# Patient Record
Sex: MALE | Race: BLACK OR AFRICAN AMERICAN | NOT HISPANIC OR LATINO | Employment: FULL TIME | URBAN - METROPOLITAN AREA
[De-identification: names, ages, dates, MRNs, and addresses within clinical notes are randomized per-mention and may not be internally consistent; named-entity substitution may affect disease eponyms.]

---

## 2023-06-20 ENCOUNTER — OFFICE VISIT (OUTPATIENT)
Dept: PRIMARY CARE | Facility: CLINIC | Age: 53
End: 2023-06-20
Payer: COMMERCIAL

## 2023-06-20 VITALS
OXYGEN SATURATION: 98 % | TEMPERATURE: 98 F | BODY MASS INDEX: 27.32 KG/M2 | SYSTOLIC BLOOD PRESSURE: 145 MMHG | HEIGHT: 65 IN | DIASTOLIC BLOOD PRESSURE: 93 MMHG | RESPIRATION RATE: 17 BRPM | WEIGHT: 164 LBS | HEART RATE: 69 BPM

## 2023-06-20 DIAGNOSIS — E78.5 HYPERLIPIDEMIA, UNSPECIFIED HYPERLIPIDEMIA TYPE: ICD-10-CM

## 2023-06-20 DIAGNOSIS — Z00.00 HEALTHCARE MAINTENANCE: ICD-10-CM

## 2023-06-20 DIAGNOSIS — M25.512 CHRONIC LEFT SHOULDER PAIN: ICD-10-CM

## 2023-06-20 DIAGNOSIS — G89.29 CHRONIC LEFT SHOULDER PAIN: ICD-10-CM

## 2023-06-20 DIAGNOSIS — I10 PRIMARY HYPERTENSION: Primary | ICD-10-CM

## 2023-06-20 DIAGNOSIS — R73.03 PREDIABETES: ICD-10-CM

## 2023-06-20 PROCEDURE — 93000 ELECTROCARDIOGRAM COMPLETE: CPT | Performed by: STUDENT IN AN ORGANIZED HEALTH CARE EDUCATION/TRAINING PROGRAM

## 2023-06-20 PROCEDURE — 99214 OFFICE O/P EST MOD 30 MIN: CPT | Performed by: STUDENT IN AN ORGANIZED HEALTH CARE EDUCATION/TRAINING PROGRAM

## 2023-06-20 PROCEDURE — 3077F SYST BP >= 140 MM HG: CPT | Performed by: STUDENT IN AN ORGANIZED HEALTH CARE EDUCATION/TRAINING PROGRAM

## 2023-06-20 PROCEDURE — 3080F DIAST BP >= 90 MM HG: CPT | Performed by: STUDENT IN AN ORGANIZED HEALTH CARE EDUCATION/TRAINING PROGRAM

## 2023-06-20 RX ORDER — LISINOPRIL 40 MG/1
40 TABLET ORAL DAILY
Qty: 30 TABLET | Refills: 2 | Status: SHIPPED | OUTPATIENT
Start: 2023-06-20 | End: 2023-07-14

## 2023-06-20 RX ORDER — LISINOPRIL 30 MG/1
30 TABLET ORAL DAILY
COMMUNITY
End: 2023-06-20 | Stop reason: SDUPTHER

## 2023-06-20 RX ORDER — HYDROCHLOROTHIAZIDE 25 MG/1
25 TABLET ORAL DAILY
COMMUNITY
End: 2023-12-07 | Stop reason: SDUPTHER

## 2023-06-20 NOTE — PROGRESS NOTES
"Subjective   Patient ID: Joe Muñoz is a 52 y.o. male who presents for follow-up  HPI  Overall patient is here for a follow-up.  Reports ongoing left shoulder pain since March, 6/10 in intensity, nonradiating, aggravated by movement especially lifting his hand around shoulder level and reduced at rest.  As per hypertension he reports to be compliant with meds.  Denies any other complaints.         Past Medical History:   Diagnosis Date    Essential (primary) hypertension 10/14/2022    Hypertension    Personal history of peptic ulcer disease     History of peptic ulcer      Past Surgical History:   Procedure Laterality Date    OTHER SURGICAL HISTORY  05/06/2015    Open Treatment Of Fracture Of One Metacarpal Bone        No Known Allergies       Occupation: works for [employer]     Review of Systems   Constitutional:  Negative for activity change and fever.   HENT:  Negative for congestion.    Respiratory:  Negative for cough, shortness of breath and wheezing.    Cardiovascular:  Negative for chest pain and leg swelling.   Gastrointestinal:  Negative for abdominal pain, constipation, nausea and vomiting.   Endocrine: Negative for cold intolerance.   Genitourinary:  Negative for dysuria, hematuria and urgency.   Musculoskeletal:  Positive for arthralgias.   Neurological:  Negative for dizziness, speech difficulty, weakness and numbness.   Psychiatric/Behavioral:  Negative for self-injury and suicidal ideas.        Objective   Visit Vitals  BP (!) 145/93   Pulse 69   Temp 36.7 °C (98 °F)   Resp 17   Ht 1.651 m (5' 5\")   Wt 74.4 kg (164 lb)   SpO2 98%   BMI 27.29 kg/m²   Smoking Status Never   BSA 1.85 m²      Physical Exam  HENT:      Head: Normocephalic and atraumatic.      Right Ear: Tympanic membrane normal.      Left Ear: Tympanic membrane normal.      Nose: Nose normal.      Mouth/Throat:      Mouth: Mucous membranes are moist.   Eyes:      Extraocular Movements: Extraocular movements intact.      " Conjunctiva/sclera: Conjunctivae normal.      Pupils: Pupils are equal, round, and reactive to light.   Cardiovascular:      Rate and Rhythm: Normal rate and regular rhythm.      Pulses: Normal pulses.      Heart sounds: Normal heart sounds.   Pulmonary:      Effort: Pulmonary effort is normal.      Breath sounds: Normal breath sounds. No stridor. No rhonchi.   Abdominal:      General: Bowel sounds are normal.      Palpations: Abdomen is soft.      Tenderness: There is no abdominal tenderness. There is no guarding or rebound.   Musculoskeletal:      Right shoulder: Normal. No swelling.      Left shoulder: No swelling, deformity, effusion, laceration, tenderness or bony tenderness. Decreased range of motion.      Cervical back: Neck supple.   Neurological:      Mental Status: He is oriented to person, place, and time.   Psychiatric:         Mood and Affect: Mood normal.         Behavior: Behavior normal.         Assessment/Plan     Problem List Items Addressed This Visit    None  Visit Diagnoses       Primary hypertension    -  Primary    Relevant Medications    lisinopril 40 mg tablet    Other Relevant Orders    CBC (Completed)    Comprehensive Metabolic Panel (Completed)    CT cardiac scoring wo IV contrast    ECG 12 lead (Completed)    Healthcare maintenance        Relevant Orders    PSA (Completed)    Hepatitis B Surface Antigen (Completed)    Hepatitis B Surface Antibody (Completed)    Prediabetes        Relevant Orders    Hemoglobin A1C (Completed)    Lipid Panel (Completed)    Hyperlipidemia, unspecified hyperlipidemia type        Relevant Orders    Lipid Panel (Completed)    TSH with reflex to Free T4 if abnormal (Completed)    CT cardiac scoring wo IV contrast    Chronic left shoulder pain        Relevant Orders    Referral to Orthopaedic Surgery    XR shoulder left 2+ views (Completed)            Overall patient is here for follow-up blood pressures above goal.  Repeat blood pressure reading around the  same.  Advised patient to increase lisinopril to 40 mg.  Agreeable  The 10-year ASCVD risk score (Caty HINKLE, et al., 2019) is: 11.4%    Values used to calculate the score:      Age: 52 years      Sex: Male      Is Non- : Yes      Diabetic: No      Tobacco smoker: No      Systolic Blood Pressure: 145 mmHg      Is BP treated: Yes      HDL Cholesterol: 58.4 mg/dL      Total Cholesterol: 224 mg/dL     Other labs for prediabetes, prostate cancer screening added on.  Patient agreeable  In office EKG shows normal sinus rhythm with no ST-T wave changes.  Cologuard screening in 2022 was negative.  Wants to think about vaccinations advise CT cardiac scoring due to hyperlipidemia, prediabetes and hypertension.  Agreeable  Once about results are obtained we will call the patient with abnormal results of any and discuss further evaluation and management.

## 2023-06-20 NOTE — PATIENT INSTRUCTIONS
Here is a summary of what we have discussed  1.  Your blood pressure was slightly elevated.  We are increasing your lisinopril to 40 mg.  Please continue taking hydrochlorothiazide  2.  We have ordered some blood work to check your cholesterol, prediabetics and prostate specific antigen [for prostate cancer screening].  Once we have the labs will call to discuss this  3.  We checked an EKG of your heart.  It is within normal limits.  I have ordered a CT cardiac calcium scoring.  Please call the scheduling line below to set up this appointment  4.  We have ordered a left shoulder x-ray to evaluate your left shoulder pain.  I have also placed an orthopedic referral for this.  Please call the scheduling line to set up for both x-ray and orthopedic referral.  Please see me back in 3 months for a blood pressure and cholesterol check.

## 2023-06-21 ENCOUNTER — LAB (OUTPATIENT)
Dept: LAB | Facility: LAB | Age: 53
End: 2023-06-21
Payer: COMMERCIAL

## 2023-06-21 DIAGNOSIS — I10 PRIMARY HYPERTENSION: ICD-10-CM

## 2023-06-21 DIAGNOSIS — R73.03 PREDIABETES: ICD-10-CM

## 2023-06-21 DIAGNOSIS — E78.5 HYPERLIPIDEMIA, UNSPECIFIED HYPERLIPIDEMIA TYPE: ICD-10-CM

## 2023-06-21 DIAGNOSIS — Z00.00 HEALTHCARE MAINTENANCE: ICD-10-CM

## 2023-06-21 LAB
ALANINE AMINOTRANSFERASE (SGPT) (U/L) IN SER/PLAS: 25 U/L (ref 10–52)
ALBUMIN (G/DL) IN SER/PLAS: 4.5 G/DL (ref 3.4–5)
ALKALINE PHOSPHATASE (U/L) IN SER/PLAS: 79 U/L (ref 33–120)
ANION GAP IN SER/PLAS: 11 MMOL/L (ref 10–20)
ASPARTATE AMINOTRANSFERASE (SGOT) (U/L) IN SER/PLAS: 22 U/L (ref 9–39)
BILIRUBIN TOTAL (MG/DL) IN SER/PLAS: 0.3 MG/DL (ref 0–1.2)
CALCIUM (MG/DL) IN SER/PLAS: 9.8 MG/DL (ref 8.6–10.6)
CARBON DIOXIDE, TOTAL (MMOL/L) IN SER/PLAS: 29 MMOL/L (ref 21–32)
CHLORIDE (MMOL/L) IN SER/PLAS: 102 MMOL/L (ref 98–107)
CHOLESTEROL (MG/DL) IN SER/PLAS: 202 MG/DL (ref 0–199)
CHOLESTEROL IN HDL (MG/DL) IN SER/PLAS: 58.7 MG/DL
CHOLESTEROL/HDL RATIO: 3.4
CREATININE (MG/DL) IN SER/PLAS: 0.93 MG/DL (ref 0.5–1.3)
ERYTHROCYTE DISTRIBUTION WIDTH (RATIO) BY AUTOMATED COUNT: 12.6 % (ref 11.5–14.5)
ERYTHROCYTE MEAN CORPUSCULAR HEMOGLOBIN CONCENTRATION (G/DL) BY AUTOMATED: 34.4 G/DL (ref 32–36)
ERYTHROCYTE MEAN CORPUSCULAR VOLUME (FL) BY AUTOMATED COUNT: 91 FL (ref 80–100)
ERYTHROCYTES (10*6/UL) IN BLOOD BY AUTOMATED COUNT: 4.9 X10E12/L (ref 4.5–5.9)
GFR MALE: >90 ML/MIN/1.73M2
GLUCOSE (MG/DL) IN SER/PLAS: 102 MG/DL (ref 74–99)
HEMATOCRIT (%) IN BLOOD BY AUTOMATED COUNT: 44.5 % (ref 41–52)
HEMOGLOBIN (G/DL) IN BLOOD: 15.3 G/DL (ref 13.5–17.5)
LDL: 110 MG/DL (ref 0–99)
LEUKOCYTES (10*3/UL) IN BLOOD BY AUTOMATED COUNT: 5.1 X10E9/L (ref 4.4–11.3)
NRBC (PER 100 WBCS) BY AUTOMATED COUNT: 0 /100 WBC (ref 0–0)
PLATELETS (10*3/UL) IN BLOOD AUTOMATED COUNT: 241 X10E9/L (ref 150–450)
POTASSIUM (MMOL/L) IN SER/PLAS: 3.9 MMOL/L (ref 3.5–5.3)
PROTEIN TOTAL: 7.6 G/DL (ref 6.4–8.2)
SODIUM (MMOL/L) IN SER/PLAS: 138 MMOL/L (ref 136–145)
TRIGLYCERIDE (MG/DL) IN SER/PLAS: 169 MG/DL (ref 0–149)
UREA NITROGEN (MG/DL) IN SER/PLAS: 14 MG/DL (ref 6–23)
VLDL: 34 MG/DL (ref 0–40)

## 2023-06-21 PROCEDURE — 80061 LIPID PANEL: CPT

## 2023-06-21 PROCEDURE — 83036 HEMOGLOBIN GLYCOSYLATED A1C: CPT

## 2023-06-21 PROCEDURE — 36415 COLL VENOUS BLD VENIPUNCTURE: CPT

## 2023-06-21 PROCEDURE — 87340 HEPATITIS B SURFACE AG IA: CPT

## 2023-06-21 PROCEDURE — 84153 ASSAY OF PSA TOTAL: CPT

## 2023-06-21 PROCEDURE — 84443 ASSAY THYROID STIM HORMONE: CPT

## 2023-06-21 PROCEDURE — 80053 COMPREHEN METABOLIC PANEL: CPT

## 2023-06-21 PROCEDURE — 85027 COMPLETE CBC AUTOMATED: CPT

## 2023-06-21 PROCEDURE — 86706 HEP B SURFACE ANTIBODY: CPT

## 2023-06-22 LAB
ESTIMATED AVERAGE GLUCOSE FOR HBA1C: 114 MG/DL
HEMOGLOBIN A1C/HEMOGLOBIN TOTAL IN BLOOD: 5.6 %
HEPATITIS B VIRUS SURFACE AB (MIU/ML) IN SERUM: <3.1 MIU/ML
HEPATITIS B VIRUS SURFACE AG PRESENCE IN SERUM: NONREACTIVE
PROSTATE SPECIFIC AG (NG/ML) IN SER/PLAS: 0.46 NG/ML (ref 0–4)
THYROTROPIN (MIU/L) IN SER/PLAS BY DETECTION LIMIT <= 0.05 MIU/L: 1.35 MIU/L (ref 0.44–3.98)

## 2023-06-30 ENCOUNTER — TELEPHONE (OUTPATIENT)
Dept: PRIMARY CARE | Facility: CLINIC | Age: 53
End: 2023-06-30
Payer: COMMERCIAL

## 2023-06-30 DIAGNOSIS — M25.512 CHRONIC LEFT SHOULDER PAIN: Primary | ICD-10-CM

## 2023-06-30 DIAGNOSIS — G89.29 CHRONIC LEFT SHOULDER PAIN: Primary | ICD-10-CM

## 2023-06-30 NOTE — TELEPHONE ENCOUNTER
Result Communication    Resulted Orders   Hemoglobin A1C   Result Value Ref Range    Hemoglobin A1C 5.6 %      Comment:           Diagnosis of Diabetes-Adults   Non-Diabetic: < or = 5.6%   Increased risk for developing diabetes: 5.7-6.4%   Diagnostic of diabetes: > or = 6.5%  .       Monitoring of Diabetes                Age (y)     Therapeutic Goal (%)   Adults:          >18           <7.0   Pediatrics:    13-18           <7.5                   7-12           <8.0                   0- 6            7.5-8.5   American Diabetes Association. Diabetes Care 33(S1), Jan 2010.    Estimated Average Glucose 114 MG/DL   CBC   Result Value Ref Range    WBC 5.1 4.4 - 11.3 x10E9/L    nRBC 0.0 0.0 - 0.0 /100 WBC    RBC 4.90 4.50 - 5.90 x10E12/L    Hemoglobin 15.3 13.5 - 17.5 g/dL    Hematocrit 44.5 41.0 - 52.0 %    MCV 91 80 - 100 fL    MCHC 34.4 32.0 - 36.0 g/dL    Platelets 241 150 - 450 x10E9/L    RDW 12.6 11.5 - 14.5 %   Comprehensive Metabolic Panel   Result Value Ref Range    Glucose 102 (H) 74 - 99 mg/dL    Sodium 138 136 - 145 mmol/L    Potassium 3.9 3.5 - 5.3 mmol/L    Chloride 102 98 - 107 mmol/L    Bicarbonate 29 21 - 32 mmol/L    Anion Gap 11 10 - 20 mmol/L    Urea Nitrogen 14 6 - 23 mg/dL    Creatinine 0.93 0.50 - 1.30 mg/dL    GFR MALE >90 >90 mL/min/1.73m2      Comment:       CALCULATIONS OF ESTIMATED GFR ARE PERFORMED   USING THE 2021 CKD-EPI STUDY REFIT EQUATION   WITHOUT THE RACE VARIABLE FOR THE IDMS-TRACEABLE   CREATININE METHODS.    https://jasn.asnjournals.org/content/early/2021/09/22/ASN.0544969575    Calcium 9.8 8.6 - 10.6 mg/dL    Albumin 4.5 3.4 - 5.0 g/dL    Alkaline Phosphatase 79 33 - 120 U/L    Total Protein 7.6 6.4 - 8.2 g/dL    AST 22 9 - 39 U/L    Total Bilirubin 0.3 0.0 - 1.2 mg/dL    ALT (SGPT) 25 10 - 52 U/L      Comment:       Patients treated with Sulfasalazine may generate    falsely decreased results for ALT.   Lipid Panel   Result Value Ref Range    Cholesterol 202 (H) 0 - 199 mg/dL       Comment:      .      AGE      DESIRABLE   BORDERLINE HIGH   HIGH     0-19 Y     0 - 169       170 - 199     >/= 200    20-24 Y     0 - 189       190 - 224     >/= 225         >24 Y     0 - 199       200 - 239     >/= 240   **All ranges are based on fasting samples. Specific   therapeutic targets will vary based on patient-specific   cardiac risk.  .   Pediatric guidelines reference:Pediatrics 2011, 128(S5).   Adult guidelines reference: NCEP ATPIII Guidelines,     SALIMA 2001, 258:2486-97  .   Venipuncture immediately after or during the    administration of Metamizole may lead to falsely   low results. Testing should be performed immediately   prior to Metamizole dosing.    HDL 58.7 mg/dL      Comment:      .      AGE      VERY LOW   LOW     NORMAL    HIGH       0-19 Y       < 35   < 40     40-45     ----    20-24 Y       ----   < 40       >45     ----      >24 Y       ----   < 40     40-60      >60  .    Cholesterol/HDL Ratio 3.4       Comment:      REF VALUES  DESIRABLE  < 3.4  HIGH RISK  > 5.0     (H) 0 - 99 mg/dL      Comment:      .                           NEAR      BORD      AGE      DESIRABLE  OPTIMAL    HIGH     HIGH     VERY HIGH     0-19 Y     0 - 109     ---    110-129   >/= 130     ----    20-24 Y     0 - 119     ---    120-159   >/= 160     ----      >24 Y     0 -  99   100-129  130-159   160-189     >/=190  .    VLDL 34 0 - 40 mg/dL    Triglycerides 169 (H) 0 - 149 mg/dL      Comment:      .      AGE      DESIRABLE   BORDERLINE HIGH   HIGH     VERY HIGH   0 D-90 D    19 - 174         ----         ----        ----  91 D- 9 Y     0 -  74        75 -  99     >/= 100      ----    10-19 Y     0 -  89        90 - 129     >/= 130      ----    20-24 Y     0 - 114       115 - 149     >/= 150      ----         >24 Y     0 - 149       150 - 199    200- 499    >/= 500  .   Venipuncture immediately after or during the    administration of Metamizole may lead to falsely   low results. Testing should be  performed immediately   prior to Metamizole dosing.   TSH with reflex to Free T4 if abnormal   Result Value Ref Range    TSH 1.35 0.44 - 3.98 mIU/L      Comment:       TSH testing is performed using different testing    methodology at [facility] than at other    [facility]. Direct result comparisons should    only be made within the same method.   PSA   Result Value Ref Range    PSA 0.46 0.00 - 4.00 ng/mL      Comment:      The FDA requires that the method used for PSA assay be   reported to the physician. Values obtained with different   assay methods must not be used interchangeably. This test   was performed at [facility] using the Siemens  [employer] PSA method, which is a sandwich immunoassay using   chemiluminescence for quantitation. The assay is approved  for measurement of prostate-specific antigen (PSA) in   serum and may be used in conjunction with a digital rectal  examination in men 50 years and older as an aid in   detection of prostate cancer.   5-Alpha-reductase inhibitors (e.g. Proscar, Finasteride,   Avodart, Dutasteride and Irene) for the treatment of BPH   have been shown to lower PSA levels by an average of 50%   after 6 months of treatment.   Hepatitis B Surface Antigen   Result Value Ref Range    Hepatitis B Surface Ag NONREACTIVE NONREACTIVE      Comment:       Biotin interference may cause falsely decreased results.   Patients taking a Biotin dose of up to 5 mg/day should   refrain from taking Biotin for 24 hours before sample   collection. Providers may contact their local laboratory   for further information.   Hepatitis B Surface Antibody   Result Value Ref Range    Hepatitis B Surface Ab <3.1 <10 mIU/mL      Comment:      INTERPRETIVE CRITERIA:  <10 mIU/mL....NONREACTIVE    >=10 mIU/mL...REACTIVE   .   Biotin interference may cause falsely decreased results.   Patients taking a Biotin dose of up to 5 mg/day should   refrain from taking Biotin for 24  hours before sample   collection. Providers may contact their local laboratory   for further information.       11:00 AM      The 10-year ASCVD risk score (Caty HINKLE, et al., 2019) is: 11.5%    Values used to calculate the score:      Age: 53 years      Sex: Male      Is Non- : Yes      Diabetic: No      Tobacco smoker: No      Systolic Blood Pressure: 145 mmHg      Is BP treated: Yes      HDL Cholesterol: 58.7 mg/dL      Total Cholesterol: 202 mg/dL   Wants to get PT for shoulder  Wants lifestyle modifications before starting statins. Benefots and risks discussed

## 2023-07-05 ASSESSMENT — ENCOUNTER SYMPTOMS
SPEECH DIFFICULTY: 0
FEVER: 0
COUGH: 0
NAUSEA: 0
CONSTIPATION: 0
ACTIVITY CHANGE: 0
NUMBNESS: 0
ABDOMINAL PAIN: 0
VOMITING: 0
DIZZINESS: 0
HEMATURIA: 0
DYSURIA: 0
ARTHRALGIAS: 1
WHEEZING: 0
SHORTNESS OF BREATH: 0
WEAKNESS: 0

## 2023-07-13 DIAGNOSIS — I10 PRIMARY HYPERTENSION: ICD-10-CM

## 2023-07-14 RX ORDER — LISINOPRIL 40 MG/1
TABLET ORAL
Qty: 30 TABLET | Refills: 2 | Status: SHIPPED | OUTPATIENT
Start: 2023-07-14 | End: 2023-10-11

## 2023-10-07 PROBLEM — R73.03 PREDIABETES: Status: ACTIVE | Noted: 2023-10-07

## 2023-10-07 PROBLEM — M77.11 LATERAL EPICONDYLITIS OF RIGHT ELBOW: Status: ACTIVE | Noted: 2023-10-07

## 2023-10-07 PROBLEM — M79.671 RIGHT FOOT PAIN: Status: ACTIVE | Noted: 2023-10-07

## 2023-10-07 PROBLEM — M75.42 IMPINGEMENT SYNDROME OF LEFT SHOULDER: Status: ACTIVE | Noted: 2023-10-07

## 2023-10-07 PROBLEM — R09.82 POST-NASAL DRAINAGE: Status: ACTIVE | Noted: 2023-10-07

## 2023-10-07 PROBLEM — M25.521 RIGHT ELBOW PAIN: Status: ACTIVE | Noted: 2023-10-07

## 2023-10-07 PROBLEM — R06.83 SNORES: Status: ACTIVE | Noted: 2023-10-07

## 2023-10-07 PROBLEM — M20.12 HALLUX VALGUS OF LEFT FOOT: Status: ACTIVE | Noted: 2023-10-07

## 2023-10-07 PROBLEM — T14.8XXA AVULSION FRACTURE: Status: ACTIVE | Noted: 2023-10-07

## 2023-10-07 PROBLEM — M75.82 TENDINITIS OF LEFT ROTATOR CUFF: Status: ACTIVE | Noted: 2023-10-07

## 2023-10-07 PROBLEM — M25.512 SHOULDER PAIN, LEFT: Status: ACTIVE | Noted: 2023-10-07

## 2023-10-07 PROBLEM — R13.10 DYSPHAGIA: Status: ACTIVE | Noted: 2023-10-07

## 2023-10-07 PROBLEM — M19.079 1ST MTP ARTHRITIS: Status: ACTIVE | Noted: 2023-10-07

## 2023-10-07 PROBLEM — M77.8 ELBOW TENDINITIS: Status: ACTIVE | Noted: 2023-10-07

## 2023-10-07 PROBLEM — E78.2 COMBINED HYPERLIPIDEMIA: Status: ACTIVE | Noted: 2023-10-07

## 2023-10-07 PROBLEM — K21.9 ESOPHAGEAL REFLUX: Status: ACTIVE | Noted: 2023-10-07

## 2023-10-07 PROBLEM — I10 HYPERTENSION: Status: ACTIVE | Noted: 2023-10-07

## 2023-10-07 RX ORDER — MELOXICAM 15 MG/1
1 TABLET ORAL DAILY
COMMUNITY
Start: 2021-06-01 | End: 2023-12-07

## 2023-10-07 RX ORDER — PHENOL/SODIUM PHENOLATE
20 AEROSOL, SPRAY (ML) MUCOUS MEMBRANE
COMMUNITY
Start: 2018-04-06

## 2023-10-07 RX ORDER — LIDOCAINE 560 MG/1
PATCH PERCUTANEOUS; TOPICAL; TRANSDERMAL
COMMUNITY
Start: 2020-08-22 | End: 2024-02-07 | Stop reason: ALTCHOICE

## 2023-10-07 RX ORDER — DICLOFENAC SODIUM 10 MG/G
GEL TOPICAL
COMMUNITY
Start: 2022-02-25

## 2023-10-11 ENCOUNTER — APPOINTMENT (OUTPATIENT)
Dept: ORTHOPEDIC SURGERY | Facility: CLINIC | Age: 53
End: 2023-10-11
Payer: COMMERCIAL

## 2023-10-11 DIAGNOSIS — I10 PRIMARY HYPERTENSION: ICD-10-CM

## 2023-10-11 RX ORDER — LISINOPRIL 40 MG/1
40 TABLET ORAL DAILY
Qty: 30 TABLET | Refills: 0 | Status: SHIPPED | OUTPATIENT
Start: 2023-10-11 | End: 2023-11-03

## 2023-11-02 ENCOUNTER — APPOINTMENT (OUTPATIENT)
Dept: RADIOLOGY | Facility: HOSPITAL | Age: 53
End: 2023-11-02
Payer: COMMERCIAL

## 2023-11-03 DIAGNOSIS — I10 PRIMARY HYPERTENSION: ICD-10-CM

## 2023-11-03 RX ORDER — LISINOPRIL 40 MG/1
40 TABLET ORAL DAILY
Qty: 90 TABLET | Refills: 0 | Status: SHIPPED | OUTPATIENT
Start: 2023-11-03 | End: 2023-12-07 | Stop reason: SDUPTHER

## 2023-11-28 ENCOUNTER — APPOINTMENT (OUTPATIENT)
Dept: PRIMARY CARE | Facility: CLINIC | Age: 53
End: 2023-11-28
Payer: COMMERCIAL

## 2023-12-07 ENCOUNTER — OFFICE VISIT (OUTPATIENT)
Dept: PRIMARY CARE | Facility: CLINIC | Age: 53
End: 2023-12-07
Payer: COMMERCIAL

## 2023-12-07 VITALS
SYSTOLIC BLOOD PRESSURE: 128 MMHG | BODY MASS INDEX: 26.49 KG/M2 | WEIGHT: 159 LBS | RESPIRATION RATE: 16 BRPM | TEMPERATURE: 98 F | OXYGEN SATURATION: 97 % | HEIGHT: 65 IN | HEART RATE: 73 BPM | DIASTOLIC BLOOD PRESSURE: 85 MMHG

## 2023-12-07 DIAGNOSIS — I10 PRIMARY HYPERTENSION: ICD-10-CM

## 2023-12-07 DIAGNOSIS — E78.2 COMBINED HYPERLIPIDEMIA: Primary | ICD-10-CM

## 2023-12-07 PROCEDURE — 99214 OFFICE O/P EST MOD 30 MIN: CPT | Performed by: STUDENT IN AN ORGANIZED HEALTH CARE EDUCATION/TRAINING PROGRAM

## 2023-12-07 PROCEDURE — 3074F SYST BP LT 130 MM HG: CPT | Performed by: STUDENT IN AN ORGANIZED HEALTH CARE EDUCATION/TRAINING PROGRAM

## 2023-12-07 PROCEDURE — 3079F DIAST BP 80-89 MM HG: CPT | Performed by: STUDENT IN AN ORGANIZED HEALTH CARE EDUCATION/TRAINING PROGRAM

## 2023-12-07 RX ORDER — HYDROCHLOROTHIAZIDE 25 MG/1
25 TABLET ORAL DAILY
Qty: 90 TABLET | Refills: 0 | Status: SHIPPED | OUTPATIENT
Start: 2023-12-07 | End: 2024-03-25

## 2023-12-07 RX ORDER — LISINOPRIL 40 MG/1
40 TABLET ORAL DAILY
Qty: 90 TABLET | Refills: 0 | Status: SHIPPED | OUTPATIENT
Start: 2023-12-07 | End: 2024-05-02 | Stop reason: SDUPTHER

## 2023-12-07 NOTE — PROGRESS NOTES
"Subjective   Patient ID: Joe Muñoz is a 53 y.o. male who presents for Follow-up.  HPI  Patient is 53 years old with history of hypertension, prediabetes hyperlipidemia is here for a follow-up.  No concerns endorsed  Past Medical History:   Diagnosis Date    Essential (primary) hypertension 10/14/2022    Hypertension    Personal history of peptic ulcer disease     History of peptic ulcer      Past Surgical History:   Procedure Laterality Date    OTHER SURGICAL HISTORY  05/06/2015    Open Treatment Of Fracture Of One Metacarpal Bone      Family History   Problem Relation Name Age of Onset    Hypertension Mother        No Known Allergies       Occupation:     Review of Systems   Constitutional:  Negative for activity change and fever.   HENT:  Negative for congestion.    Respiratory:  Negative for cough, shortness of breath and wheezing.    Cardiovascular:  Negative for chest pain and leg swelling.   Gastrointestinal:  Negative for abdominal pain, constipation, nausea and vomiting.   Endocrine: Negative for cold intolerance.   Genitourinary:  Negative for dysuria, hematuria and urgency.   Neurological:  Negative for dizziness, speech difficulty, weakness and numbness.   Psychiatric/Behavioral:  Negative for self-injury and suicidal ideas.        Objective   Visit Vitals  /85   Pulse 73   Temp 36.7 °C (98 °F)   Resp 16   Ht 1.651 m (5' 5\")   Wt 72.1 kg (159 lb)   SpO2 97%   BMI 26.46 kg/m²   Smoking Status Never   BSA 1.82 m²      Physical Exam  Constitutional:       Appearance: Normal appearance.   HENT:      Head: Normocephalic and atraumatic.      Nose: Nose normal.      Mouth/Throat:      Mouth: Mucous membranes are moist.   Eyes:      Conjunctiva/sclera: Conjunctivae normal.      Pupils: Pupils are equal, round, and reactive to light.   Cardiovascular:      Rate and Rhythm: Normal rate and regular rhythm.      Pulses: Normal pulses.      Heart sounds: Normal heart sounds.   Pulmonary:      Effort: " Pulmonary effort is normal.      Breath sounds: Normal breath sounds.   Musculoskeletal:         General: Normal range of motion.      Cervical back: Neck supple.   Skin:     General: Skin is warm.   Neurological:      General: No focal deficit present.      Mental Status: He is alert and oriented to person, place, and time.   Psychiatric:         Mood and Affect: Mood normal.         Behavior: Behavior normal.         Thought Content: Thought content normal.         Judgment: Judgment normal.         Assessment/Plan   Diagnoses and all orders for this visit:  Primary hypertension  -     hydroCHLOROthiazide (HYDRODiuril) 25 mg tablet; Take 1 tablet (25 mg) by mouth once daily.  -     lisinopril 40 mg tablet; Take 1 tablet (40 mg) by mouth once daily.  Blood pressure at goal today.  Continue hydrochlorothiazide 25 mg and lisinopril 40 mg       Hyperlipidemia  To make CT cardiac scoring appointment  Ordered last visit.  The 10-year ASCVD risk score (Caty HINKLE, et al., 2019) is: 9.2%    Values used to calculate the score:      Age: 53 years      Sex: Male      Is Non- : Yes      Diabetic: No      Tobacco smoker: No      Systolic Blood Pressure: 128 mmHg      Is BP treated: Yes      HDL Cholesterol: 58.7 mg/dL      Total Cholesterol: 202 mg/dL   To let lifestyle modifications to be followed.  Patient verbalizes understanding.

## 2023-12-31 ASSESSMENT — ENCOUNTER SYMPTOMS
COUGH: 0
SHORTNESS OF BREATH: 0
ABDOMINAL PAIN: 0
NAUSEA: 0
VOMITING: 0
CONSTIPATION: 0
HEMATURIA: 0
SPEECH DIFFICULTY: 0
DIZZINESS: 0
DYSURIA: 0
NUMBNESS: 0
ACTIVITY CHANGE: 0
WHEEZING: 0
FEVER: 0
WEAKNESS: 0

## 2024-01-23 ENCOUNTER — ANCILLARY PROCEDURE (OUTPATIENT)
Dept: RADIOLOGY | Facility: CLINIC | Age: 54
End: 2024-01-23
Payer: COMMERCIAL

## 2024-01-23 DIAGNOSIS — M75.82 OTHER SHOULDER LESIONS, LEFT SHOULDER: ICD-10-CM

## 2024-01-23 PROCEDURE — 73221 MRI JOINT UPR EXTREM W/O DYE: CPT | Mod: LT

## 2024-01-23 PROCEDURE — 73221 MRI JOINT UPR EXTREM W/O DYE: CPT | Mod: LEFT SIDE | Performed by: STUDENT IN AN ORGANIZED HEALTH CARE EDUCATION/TRAINING PROGRAM

## 2024-01-31 ENCOUNTER — OFFICE VISIT (OUTPATIENT)
Dept: ORTHOPEDIC SURGERY | Facility: CLINIC | Age: 54
End: 2024-01-31
Payer: COMMERCIAL

## 2024-01-31 ENCOUNTER — PREP FOR PROCEDURE (OUTPATIENT)
Dept: ORTHOPEDIC SURGERY | Facility: CLINIC | Age: 54
End: 2024-01-31

## 2024-01-31 DIAGNOSIS — M19.012 ARTHRITIS OF LEFT SHOULDER REGION: ICD-10-CM

## 2024-01-31 DIAGNOSIS — S46.012D TRAUMATIC INCOMPLETE TEAR OF LEFT ROTATOR CUFF, SUBSEQUENT ENCOUNTER: Primary | ICD-10-CM

## 2024-01-31 PROBLEM — S46.012A TRAUMATIC INCOMPLETE TEAR OF LEFT ROTATOR CUFF: Status: ACTIVE | Noted: 2024-01-31

## 2024-01-31 PROCEDURE — 99214 OFFICE O/P EST MOD 30 MIN: CPT | Performed by: STUDENT IN AN ORGANIZED HEALTH CARE EDUCATION/TRAINING PROGRAM

## 2024-01-31 PROCEDURE — L3670 SO ACRO/CLAV CAN WEB PRE OTS: HCPCS | Performed by: STUDENT IN AN ORGANIZED HEALTH CARE EDUCATION/TRAINING PROGRAM

## 2024-01-31 NOTE — LETTER
January 31, 2024     Patient: Joe Muñoz   YOB: 1970   Date of Visit: 1/31/2024       To Whom It May Concern:    It is my medical opinion that Joe Muñoz needs to be on light duty starting today Jan 31, 2024 until date of surgery Feb 15, 2024. Recovery from this surgery will take 3-6 months with a tentative release back to work date of Aug 15, 2024.     If you have any questions or concerns, please don't hesitate to call.         Sincerely,        Michael Mesa MD    CC: No Recipients

## 2024-01-31 NOTE — PROGRESS NOTES
CHIEF COMPLAINT:   Chief Complaint   Patient presents with    Left Shoulder - Pain     History: 53 y.o. male presents to the office today for follow-up of his left shoulder.  He works for [employer]  He is right-hand dominant.  He has been having pain in his left shoulder for many months now.  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  The pain is primarily anterior lateral.  We have tried extensive physical therapy for this shoulder, but the pain has persisted.  At this point we got an MRI and he is here today to review that.    Past medical history, past surgical history, medications, allergies, family history, social history, and review of systems were reviewed today.    A 12 point review of systems was negative other than as stated in the HPI.    Past Medical History:   Diagnosis Date    Essential (primary) hypertension 10/14/2022    Hypertension    Personal history of peptic ulcer disease     History of peptic ulcer        No Known Allergies     Past Surgical History:   Procedure Laterality Date    OTHER SURGICAL HISTORY  05/06/2015    Open Treatment Of Fracture Of One Metacarpal Bone        Family History   Problem Relation Name Age of Onset    Hypertension Mother          Social History     Socioeconomic History    Marital status:      Spouse name: Not on file    Number of children: Not on file    Years of education: Not on file    Highest education level: Not on file   Occupational History    Not on file   Tobacco Use    Smoking status: Never    Smokeless tobacco: Not on file   Substance and Sexual Activity    Alcohol use: Not Currently    Drug use: Never    Sexual activity: Not on file   Other Topics Concern    Not on file   Social History Narrative    Not on file     Social Determinants of Health     Financial Resource Strain: Not on file   Food Insecurity: Not on file   Transportation Needs: Not on file   Physical Activity: Not on file   Stress: Not on file   Social Connections: Not  "on file   Intimate Partner Violence: Not on file   Housing Stability: Not on file        CURRENT MEDICATIONS:   Current Outpatient Medications   Medication Sig Dispense Refill    diclofenac sodium (Voltaren) 1 % gel gel Apply topically once daily.      hydroCHLOROthiazide (HYDRODiuril) 25 mg tablet Take 1 tablet (25 mg) by mouth once daily. 90 tablet 0    lidocaine 4 % patch USE AS DIRECTED ON PACKAGE      lisinopril 40 mg tablet Take 1 tablet (40 mg) by mouth once daily. 90 tablet 0    omeprazole (PriLOSEC) 20 mg tablet,delayed release (DR/EC) EC tablet Take 1 tablet (20 mg) by mouth.       No current facility-administered medications for this visit.       Physical Examination:      2/17/2021    10:53 AM 2/25/2022    10:32 AM 10/14/2022    11:27 AM 10/14/2022    12:03 PM 6/20/2023     1:27 PM 12/7/2023     2:46 PM 1/23/2024    11:33 AM   Vitals   Systolic  148 152 136 145 128    Diastolic  93 110 88 93 85    Heart Rate  88 74 79 69 73    Temp  36.3 °C (97.4 °F) 36.7 °C (98 °F)  36.7 °C (98 °F) 36.7 °C (98 °F)    Resp   18  17 16    Height (in) 1.651 m (5' 5\")  1.651 m (5' 5\")  1.651 m (5' 5\") 1.651 m (5' 5\") 1.65 m (5' 4.96\")   Weight (lb) 165 169.56 169  164 159 158.73   BMI 27.46 kg/m2 28.22 kg/m2 28.12 kg/m2  27.29 kg/m2 26.46 kg/m2 26.45 kg/m2   BSA (m2) 1.85 m2 1.88 m2 1.88 m2  1.85 m2 1.82 m2 1.82 m2   Visit Report     Report Report       There is no height or weight on file to calculate BMI.    Well-appearing, appears stated age, pleasant and cooperative, appropriate mood and behavior. Height and weight reviewed. Alert and oriented x3.  Auditory function intact.  No acute distress.  Intact ocular function, EZIO, EOMI. Breathing is unlabored .  There is no evidence of jugular venous distension. Skin appearance is normal without evidence of rash or other lesions. 2+ radial pulses bilaterally, fingers pink and wwp, good capillary refill, no pitting edema. No appreciable lymphadenopathy in bilateral upper " extremities. SILT throughout both upper extremities, median/radial/ulnar/musculocutaneous/axillary nerve motor and sensory intact (except for abnormalities noted in focused musculoskeletal exam section below).     Neck exam: Full range of motion of the neck in flexion/extension and rotational movements. No significant areas of tenderness to palpation in the neck.    On exam of the bilateral upper extremities, no significant areas of tenderness palpation. He has symmetric range of motion of both shoulders today. Active forward flexion 150, external rotation to 60, internal rotation to T12. He does have pain with passive maneuvers of the left shoulder. Furthermore, he has some subtle weakness of the left shoulder, 4+ out of 5 strength resisted forward flexion and resisted external rotation on the left side.        Imaging: MRI of the left shoulder was reviewed today.  Grossly interpreted by myself.  There is partial-thickness bursal sided tearing of the supraspinatus as well as the subscapularis tendon at its upper border.  There is significant fluid around the biceps tendon.  There is definitely some glenoid cartilage loss on the glenoid and humeral side inferiorly.  There is no full-thickness rotator cuff tearing.  There is circumferential degenerative tear of the anterior, superior and posterior labrum.    Assessment: Left shoulder early glenohumeral arthritis, partial-thickness rotator cuff tear    Plan: I had a very long discussion with the patient about treatment options and diagnosis.  The patient does have a partial-thickness rotator cuff tear of 2 of the rotator cuff tendons as well as early glenohumeral arthritis.  At this point, he has failed extensive physical therapy for more than 6 weeks as well as physician directed exercises and home exercises.  At this point, we discussed continued nonoperative management versus operative management.  He says that his shoulder is limiting his ability to do his job as  well as enjoy life, and would like to have surgery for this. This is reasonable given that he has failed conservative options.  Discussed that the surgery would be a rotator cuff debridement, as well as debridement of the anterior labrum, superior labrum, glenoid cartilage and humeral head cartilage.  If a full-thickness rotator cuff component was found, we would fix that.  Patient was amenable to this and would like to proceed.    The patient has failed conservative management, including therapy and injections.  At this point, the patient is a candidate for surgery.  Patient is in agreement with this.  Risks and recovery after surgery were discussed.  The proposed procedure will be an arthroscopic extensive debridement, and possible biceps tenodesis and rotator cuff repair depending on the state of the biceps and rotator cuff.  Risks of surgery include bleeding, infection, failure of the tendon to heal, neurovascular injury, persistent pain, failure of the repair, and possible need for further surgery.  We discussed that there are certain risk factors for the rotator cuff tendon not healing to bone, including age over 65, large and massive rotator cuff tears, smoking, as well as others. All surgeries that are performed under anesthesia also have the risks of DVTs, pulmonary embolism, potentially death. Per anesthesia's evaluation, the patient will have a nerve block, the risks of which the anesthesia team will discuss with the patient.   Patient voiced understanding of these risks and wished to proceed.      The patient will need PATs which will also include a CBC, BMP, PT/INR and a full work up by the PAT team as well as anesthesia evaluation.  My office will work to get this scheduled. I will see them back 2 weeks after surgery.    Patient was prescribed a shoulder sling for postoperative care. The patient will have weakness, instability and/or deformity of their (right/left) arm which requires stabilization from  this orthosis to improve their function after surgery. Verbal and written instructions for the use, wear schedule, cleaning and application of this item were given. Patient was instructed that should the brace result in increased pain, decreased sensation, increased swelling, or an overall worsening of their medical condition, to please contact our office immediately. Orthotic management and training was provided for skin care, modifications due to healing tissues, edema changes, interruption in skin integrity, and safety precautions with the orthosis.          Dragon software was used to dictate this note, please be aware that minor errors in transcription may be present.    Michael Mesa MD    Shoulder/Elbow Surgery  [facility]

## 2024-01-31 NOTE — LETTER
February 1, 2024     Patient: Joe Muñoz   YOB: 1970   Date of Visit: 1/31/2024       To Whom It May Concern:    It is my medical opinion that Joe Muñoz may return to light duty immediately with the following restrictions:  No lifting , pushing or pulling more than 10 LBS starting Jan 31, 2024 until date of surgery Feb 15, 2024. Recovery from this surgery will take 3-6 months with a tentative release back to work date of Aug 15, 2024.  .    If you have any questions or concerns, please don't hesitate to call.         Sincerely,        Michael Mesa MD    CC: No Recipients

## 2024-01-31 NOTE — H&P (VIEW-ONLY)
CHIEF COMPLAINT:   Chief Complaint   Patient presents with    Left Shoulder - Pain     History: 53 y.o. male presents to the office today for follow-up of his left shoulder.  He works for [employer].  He is right-hand dominant.  He has been having pain in his left shoulder for many months now.  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  The pain is primarily anterior lateral.  We have tried extensive physical therapy for this shoulder, but the pain has persisted.  At this point we got an MRI and he is here today to review that.    Past medical history, past surgical history, medications, allergies, family history, social history, and review of systems were reviewed today.    A 12 point review of systems was negative other than as stated in the HPI.    Past Medical History:   Diagnosis Date    Essential (primary) hypertension 10/14/2022    Hypertension    Personal history of peptic ulcer disease     History of peptic ulcer        No Known Allergies     Past Surgical History:   Procedure Laterality Date    OTHER SURGICAL HISTORY  05/06/2015    Open Treatment Of Fracture Of One Metacarpal Bone        Family History   Problem Relation Name Age of Onset    Hypertension Mother          Social History     Socioeconomic History    Marital status:      Spouse name: Not on file    Number of children: Not on file    Years of education: Not on file    Highest education level: Not on file   Occupational History    Not on file   Tobacco Use    Smoking status: Never    Smokeless tobacco: Not on file   Substance and Sexual Activity    Alcohol use: Not Currently    Drug use: Never    Sexual activity: Not on file   Other Topics Concern    Not on file   Social History Narrative    Not on file     Social Determinants of Health     Financial Resource Strain: Not on file   Food Insecurity: Not on file   Transportation Needs: Not on file   Physical Activity: Not on file   Stress: Not on file   Social Connections: Not  "on file   Intimate Partner Violence: Not on file   Housing Stability: Not on file        CURRENT MEDICATIONS:   Current Outpatient Medications   Medication Sig Dispense Refill    diclofenac sodium (Voltaren) 1 % gel gel Apply topically once daily.      hydroCHLOROthiazide (HYDRODiuril) 25 mg tablet Take 1 tablet (25 mg) by mouth once daily. 90 tablet 0    lidocaine 4 % patch USE AS DIRECTED ON PACKAGE      lisinopril 40 mg tablet Take 1 tablet (40 mg) by mouth once daily. 90 tablet 0    omeprazole (PriLOSEC) 20 mg tablet,delayed release (DR/EC) EC tablet Take 1 tablet (20 mg) by mouth.       No current facility-administered medications for this visit.       Physical Examination:      2/17/2021    10:53 AM 2/25/2022    10:32 AM 10/14/2022    11:27 AM 10/14/2022    12:03 PM 6/20/2023     1:27 PM 12/7/2023     2:46 PM 1/23/2024    11:33 AM   Vitals   Systolic  148 152 136 145 128    Diastolic  93 110 88 93 85    Heart Rate  88 74 79 69 73    Temp  36.3 °C (97.4 °F) 36.7 °C (98 °F)  36.7 °C (98 °F) 36.7 °C (98 °F)    Resp   18  17 16    Height (in) 1.651 m (5' 5\")  1.651 m (5' 5\")  1.651 m (5' 5\") 1.651 m (5' 5\") 1.65 m (5' 4.96\")   Weight (lb) 165 169.56 169  164 159 158.73   BMI 27.46 kg/m2 28.22 kg/m2 28.12 kg/m2  27.29 kg/m2 26.46 kg/m2 26.45 kg/m2   BSA (m2) 1.85 m2 1.88 m2 1.88 m2  1.85 m2 1.82 m2 1.82 m2   Visit Report     Report Report       There is no height or weight on file to calculate BMI.    Well-appearing, appears stated age, pleasant and cooperative, appropriate mood and behavior. Height and weight reviewed. Alert and oriented x3.  Auditory function intact.  No acute distress.  Intact ocular function, EZIO, EOMI. Breathing is unlabored .  There is no evidence of jugular venous distension. Skin appearance is normal without evidence of rash or other lesions. 2+ radial pulses bilaterally, fingers pink and wwp, good capillary refill, no pitting edema. No appreciable lymphadenopathy in bilateral upper " extremities. SILT throughout both upper extremities, median/radial/ulnar/musculocutaneous/axillary nerve motor and sensory intact (except for abnormalities noted in focused musculoskeletal exam section below).     Neck exam: Full range of motion of the neck in flexion/extension and rotational movements. No significant areas of tenderness to palpation in the neck.    On exam of the bilateral upper extremities, no significant areas of tenderness palpation. He has symmetric range of motion of both shoulders today. Active forward flexion 150, external rotation to 60, internal rotation to T12. He does have pain with passive maneuvers of the left shoulder. Furthermore, he has some subtle weakness of the left shoulder, 4+ out of 5 strength resisted forward flexion and resisted external rotation on the left side.        Imaging: MRI of the left shoulder was reviewed today.  Grossly interpreted by myself.  There is partial-thickness bursal sided tearing of the supraspinatus as well as the subscapularis tendon at its upper border.  There is significant fluid around the biceps tendon.  There is definitely some glenoid cartilage loss on the glenoid and humeral side inferiorly.  There is no full-thickness rotator cuff tearing.  There is circumferential degenerative tear of the anterior, superior and posterior labrum.    Assessment: Left shoulder early glenohumeral arthritis, partial-thickness rotator cuff tear    Plan: I had a very long discussion with the patient about treatment options and diagnosis.  The patient does have a partial-thickness rotator cuff tear of 2 of the rotator cuff tendons as well as early glenohumeral arthritis.  At this point, he has failed extensive physical therapy for more than 6 weeks as well as physician directed exercises and home exercises.  At this point, we discussed continued nonoperative management versus operative management.  He says that his shoulder is limiting his ability to do his job as  well as enjoy life, and would like to have surgery for this. This is reasonable given that he has failed conservative options.  Discussed that the surgery would be a rotator cuff debridement, as well as debridement of the anterior labrum, superior labrum, glenoid cartilage and humeral head cartilage.  If a full-thickness rotator cuff component was found, we would fix that.  Patient was amenable to this and would like to proceed.    The patient has failed conservative management, including therapy and injections.  At this point, the patient is a candidate for surgery.  Patient is in agreement with this.  Risks and recovery after surgery were discussed.  The proposed procedure will be an arthroscopic extensive debridement, and possible biceps tenodesis and rotator cuff repair depending on the state of the biceps and rotator cuff.  Risks of surgery include bleeding, infection, failure of the tendon to heal, neurovascular injury, persistent pain, failure of the repair, and possible need for further surgery.  We discussed that there are certain risk factors for the rotator cuff tendon not healing to bone, including age over 65, large and massive rotator cuff tears, smoking, as well as others. All surgeries that are performed under anesthesia also have the risks of DVTs, pulmonary embolism, potentially death. Per anesthesia's evaluation, the patient will have a nerve block, the risks of which the anesthesia team will discuss with the patient.   Patient voiced understanding of these risks and wished to proceed.      The patient will need PATs which will also include a CBC, BMP, PT/INR and a full work up by the PAT team as well as anesthesia evaluation.  My office will work to get this scheduled. I will see them back 2 weeks after surgery.    Patient was prescribed a shoulder sling for postoperative care. The patient will have weakness, instability and/or deformity of their (right/left) arm which requires stabilization from  this orthosis to improve their function after surgery. Verbal and written instructions for the use, wear schedule, cleaning and application of this item were given. Patient was instructed that should the brace result in increased pain, decreased sensation, increased swelling, or an overall worsening of their medical condition, to please contact our office immediately. Orthotic management and training was provided for skin care, modifications due to healing tissues, edema changes, interruption in skin integrity, and safety precautions with the orthosis.          Dragon software was used to dictate this note, please be aware that minor errors in transcription may be present.    Michael Mesa MD    Shoulder/Elbow Surgery  [facility]

## 2024-02-07 ENCOUNTER — TELEMEDICINE CLINICAL SUPPORT (OUTPATIENT)
Dept: PREADMISSION TESTING | Facility: HOSPITAL | Age: 54
End: 2024-02-07
Payer: COMMERCIAL

## 2024-02-07 DIAGNOSIS — M19.012 ARTHRITIS OF LEFT SHOULDER REGION: ICD-10-CM

## 2024-02-07 DIAGNOSIS — S46.012D TRAUMATIC INCOMPLETE TEAR OF LEFT ROTATOR CUFF, SUBSEQUENT ENCOUNTER: ICD-10-CM

## 2024-02-07 RX ORDER — BISMUTH SUBSALICYLATE 262 MG
1 TABLET,CHEWABLE ORAL EVERY MORNING
COMMUNITY

## 2024-02-07 RX ORDER — MELOXICAM 15 MG/1
15 TABLET ORAL DAILY PRN
COMMUNITY
End: 2024-02-15 | Stop reason: HOSPADM

## 2024-02-07 NOTE — PREPROCEDURE INSTRUCTIONS
Current Medications   Medication Instructions    hydroCHLOROthiazide (HYDRODiuril) 25 mg tablet Take morning of surgery with sip of water, no other fluids    lisinopril 40 mg tablet Continue until night before surgery    multivitamin tablet Stop 7 days before surgery                       NPO Instructions:    Do not eat any food after midnight the night before your surgery/procedure.    Additional Instructions:     Seven/Six Days before Surgery:  Review your medication instructions, stop indicated medications  Five Days before Surgery:  Review your medication instructions, stop indicated medications  Three Days before Surgery:  Review your medication instructions, stop indicated medications  The Day before Surgery:  Review your medication instructions, stop indicated medications  You will be contacted regarding the time of your arrival to facility and surgery time  Do not eat any food after Midnight  Day of Surgery:  Review your medication instructions, take indicated medications  If you have diabetes, please check your fasting blood sugar upon awakening.  If fasting blood sugar is <80 mg/dl, drink 100 ml of apple juice, time limit of 2 hours before  Wear  comfortable loose fitting clothing  Do not use moisturizers, creams, lotions or perfume  All jewelry and valuables should be left at home    PAT DISCHARGE INSTRUCTIONS    Please call the Same Day Surgery (SDS) Department of the hospital where your procedure will be performed between 2:00- 3:30 PM the day before your surgery. If you are scheduled on a Monday, or a Tuesday following a Monday holiday, you will need to call on the last business day prior to your surgery.    [facility]  [address] [facility] [place] [address]  [phone]    Please let your surgeon know if:      You develop any open sores, shingles, burning or painful urination as these may increase your risk of an infection.   You no longer wish to have the  surgery.   Any other personal circumstances change that may lead to the need to cancel or defer this surgery-such as being sick or getting admitted to any hospital within one week of your planned procedure.    Your contact details change, such as a change of address or phone number.    Starting now:     Please DO NOT drink alcohol or smoke for 24 hours before surgery. It is well known that quitting smoking can make a huge difference to your health and recovery from surgery. The longer you abstain from smoking, the better your chances of a healthy recovery. If you need help with quitting, call 1800-QUIT-NOW to be connected to a trained counselor who will discuss the best methods to help you quit.     Before your surgery:    Please stop all supplements 7 days prior to surgery. Or as directed by your surgeon.   Please stop taking NSAID pain medicine such as Advil and Motrin 7 days before surgery.    If you develop any fever, cough, cold, rashes, cuts, scratches, scrapes, urinary symptoms or infection anywhere on your body (including teeth and gums) prior to surgery, please call your surgeon’s office as soon as possible. This may require treatment to reduce the chance of cancellation on the day of surgery.    The day before your surgery:   DIET- Do not eat any food after MIDNIGHT.   Get a good night’s rest.  Use the special soap for bathing if you have been instructed to use one.    Scheduled surgery times may change and you will be notified if this occurs - please check your personal voicemail for any updates.     On the morning of surgery:   Wear comfortable, loose fitting clothes which open in the front. Please do not wear moisturizers, creams, lotions, makeup or perfume.    Please bring with you to surgery:   Photo ID and insurance card   Current list of medicines and allergies   Pacemaker/ Defibrillator/Heart stent cards   CPAP machine and mask    Slings/ splints/ crutches   A copy of your complete advanced  directive/DHPOA.    Please do NOT bring with you to surgery:   All jewelry and valuables should be left at home.   Prosthetic devices such as contact lenses, hearing aids, dentures, eyelash extensions, hairpins and body piercings must be removed prior to going in to the surgical suite.    After outpatient surgery:   A responsible adult MUST accompany you at the time of discharge and stay with you for 24 hours after your surgery. You may NOT drive yourself home after surgery.    Do not drive, operate machinery, make critical decisions or do activities that require co-ordination or balance until after a night’s sleep.   Do not drink alcoholic beverages for 24 hours.   Instructions for resuming your medications will be provided by your surgeon.    CALL YOUR DOCTOR AFTER SURGERY IF YOU HAVE:     Chills and/or a fever of 101° F or higher.    Redness, swelling, pus or drainage from your surgical wound or a bad smell from the wound.    Lightheadedness, fainting or confusion.    Persistent vomiting (throwing up) and are not able to eat or drink for 12 hours.    Three or more loose, watery bowel movements in 24 hours (diarrhea).   Difficulty or pain while urinating( after non-urological surgery)    Pain and swelling in your legs, especially if it is only on one side.    Difficulty breathing or are breathing faster than normal.    Any new concerning symptoms.      Reviewed pre-op instructions with patient, states understanding and denies further questions at this time.    If you have not received a call regarding your arrival time for surgery by 2pm on the day before surgery, you can call [phone].    Take Care Joe

## 2024-02-13 ENCOUNTER — ANESTHESIA EVENT (OUTPATIENT)
Dept: OPERATING ROOM | Facility: HOSPITAL | Age: 54
End: 2024-02-13
Payer: COMMERCIAL

## 2024-02-13 ENCOUNTER — LAB (OUTPATIENT)
Dept: LAB | Facility: LAB | Age: 54
End: 2024-02-13
Payer: COMMERCIAL

## 2024-02-13 DIAGNOSIS — S46.012D TRAUMATIC INCOMPLETE TEAR OF LEFT ROTATOR CUFF, SUBSEQUENT ENCOUNTER: Primary | ICD-10-CM

## 2024-02-13 DIAGNOSIS — S46.012D TRAUMATIC INCOMPLETE TEAR OF LEFT ROTATOR CUFF, SUBSEQUENT ENCOUNTER: ICD-10-CM

## 2024-02-13 LAB
ANION GAP SERPL CALC-SCNC: 10 MMOL/L (ref 10–20)
BUN SERPL-MCNC: 15 MG/DL (ref 6–23)
CALCIUM SERPL-MCNC: 10.4 MG/DL (ref 8.6–10.6)
CHLORIDE SERPL-SCNC: 103 MMOL/L (ref 98–107)
CO2 SERPL-SCNC: 32 MMOL/L (ref 21–32)
CREAT SERPL-MCNC: 0.93 MG/DL (ref 0.5–1.3)
EGFRCR SERPLBLD CKD-EPI 2021: >90 ML/MIN/1.73M*2
ERYTHROCYTE [DISTWIDTH] IN BLOOD BY AUTOMATED COUNT: 12.2 % (ref 11.5–14.5)
GLUCOSE SERPL-MCNC: 96 MG/DL (ref 74–99)
HCT VFR BLD AUTO: 44.3 % (ref 41–52)
HGB BLD-MCNC: 15.1 G/DL (ref 13.5–17.5)
MCH RBC QN AUTO: 31 PG (ref 26–34)
MCHC RBC AUTO-ENTMCNC: 34.1 G/DL (ref 32–36)
MCV RBC AUTO: 91 FL (ref 80–100)
NRBC BLD-RTO: 0 /100 WBCS (ref 0–0)
PLATELET # BLD AUTO: 210 X10*3/UL (ref 150–450)
POTASSIUM SERPL-SCNC: 4.1 MMOL/L (ref 3.5–5.3)
RBC # BLD AUTO: 4.87 X10*6/UL (ref 4.5–5.9)
SODIUM SERPL-SCNC: 141 MMOL/L (ref 136–145)
WBC # BLD AUTO: 5.2 X10*3/UL (ref 4.4–11.3)

## 2024-02-13 PROCEDURE — 36415 COLL VENOUS BLD VENIPUNCTURE: CPT

## 2024-02-13 PROCEDURE — 80048 BASIC METABOLIC PNL TOTAL CA: CPT

## 2024-02-13 PROCEDURE — 85027 COMPLETE CBC AUTOMATED: CPT

## 2024-02-14 RX ORDER — CEFAZOLIN SODIUM 2 G/100ML
2 INJECTION, SOLUTION INTRAVENOUS ONCE
Status: CANCELLED | OUTPATIENT
Start: 2024-02-14 | End: 2024-02-14

## 2024-02-14 NOTE — DISCHARGE INSTRUCTIONS
Shoulder and Elbow Service  Michael Mesa MD    Discharge Instructions after Arthroscopic Shoulder Repair     A sling has been provided for you. Remain in your sling at all times. This includes sleeping in your sling. *You may come out of your sling for daily exercises only.    Do not actively move your shoulder during this time. Active reaching and lifting away from the body are NOT permitted. You may use the operative arm for activities of daily living that do not require the operative arm to leave the side of the body. Such as: eating, drinking and bathing.    Remove your arm from the sling to perform elbow, wrist and hand range of motion Exercises 4-5x a day. This will help alleviate joint stiffness and swelling in your hand.   Use cryotherapy machine or ice on the shoulder intermittently over the first 72 hours up to the first 2 weeks following surgery.   Pain medication has been prescribed for you. Use your medicine liberally over the first 72 hours and only take if you are experiencing pain. You can then begin to taper your     use. You may take Extra Strength Tylenol or Tylenol only in place of the pain pills.   *DO NOT TAKE ANY nonsteroidal anti-inflammatory pain medications: Advil, Motrin, Ibuprofen, Aleve, Naproxen or Naprosyn. *UNLESS PRESCRIBED   You may remove your dressing after three days and leave open to air.   There will be sutures in place.    Do not use any aerosol deodorants or lotions on or near surgical incision(s).   You may shower 4 days after surgery. The incision(s) CANNOT get wet prior to 4 days. Simply allow the water to wash over the site and then pat dry. Do not rub the incision(s).   Take one aspirin (81 mg) daily for 2 weeks after surgery, unless you have an aspirin sensitivity or allergy, asthma or are on blood thinners.    Please call the office at [phone] for any problems. Including the following:  - Excessive redness of the incisions  - Drainage for more than 4 days  -  Fever of more than 101.5 F      Please call [phone] to make a follow-up appointment if one has not already been made      for you. You should see Dr Mesa 10-14 days after your surgical procedure.     [facility]   [place] [facility]  [facility] [address]  [id number]

## 2024-02-15 ENCOUNTER — ANESTHESIA (OUTPATIENT)
Dept: OPERATING ROOM | Facility: HOSPITAL | Age: 54
End: 2024-02-15
Payer: COMMERCIAL

## 2024-02-15 ENCOUNTER — HOSPITAL ENCOUNTER (OUTPATIENT)
Facility: HOSPITAL | Age: 54
Setting detail: OUTPATIENT SURGERY
Discharge: HOME | End: 2024-02-15
Attending: STUDENT IN AN ORGANIZED HEALTH CARE EDUCATION/TRAINING PROGRAM | Admitting: STUDENT IN AN ORGANIZED HEALTH CARE EDUCATION/TRAINING PROGRAM
Payer: COMMERCIAL

## 2024-02-15 VITALS
WEIGHT: 159.61 LBS | RESPIRATION RATE: 13 BRPM | DIASTOLIC BLOOD PRESSURE: 99 MMHG | BODY MASS INDEX: 26.59 KG/M2 | HEART RATE: 81 BPM | HEIGHT: 65 IN | OXYGEN SATURATION: 95 % | SYSTOLIC BLOOD PRESSURE: 154 MMHG | TEMPERATURE: 97.3 F

## 2024-02-15 DIAGNOSIS — S46.012D TRAUMATIC INCOMPLETE TEAR OF LEFT ROTATOR CUFF, SUBSEQUENT ENCOUNTER: ICD-10-CM

## 2024-02-15 DIAGNOSIS — M19.012 ARTHRITIS OF LEFT SHOULDER REGION: Primary | ICD-10-CM

## 2024-02-15 PROCEDURE — A29823 PR SHLDR ARTHROSCOP,EXTEN DEBRIDE: Performed by: ANESTHESIOLOGIST ASSISTANT

## 2024-02-15 PROCEDURE — 3600000002 HC OR TIME - INITIAL BASE CHARGE - PROCEDURE LEVEL TWO: Performed by: STUDENT IN AN ORGANIZED HEALTH CARE EDUCATION/TRAINING PROGRAM

## 2024-02-15 PROCEDURE — 3600000007 HC OR TIME - EACH INCREMENTAL 1 MINUTE - PROCEDURE LEVEL TWO: Performed by: STUDENT IN AN ORGANIZED HEALTH CARE EDUCATION/TRAINING PROGRAM

## 2024-02-15 PROCEDURE — 7100000009 HC PHASE TWO TIME - INITIAL BASE CHARGE: Performed by: STUDENT IN AN ORGANIZED HEALTH CARE EDUCATION/TRAINING PROGRAM

## 2024-02-15 PROCEDURE — 64415 NJX AA&/STRD BRCH PLXS IMG: CPT | Performed by: ANESTHESIOLOGY

## 2024-02-15 PROCEDURE — 29823 SHO ARTHRS SRG XTNSV DBRDMT: CPT | Performed by: STUDENT IN AN ORGANIZED HEALTH CARE EDUCATION/TRAINING PROGRAM

## 2024-02-15 PROCEDURE — A29823 PR SHLDR ARTHROSCOP,EXTEN DEBRIDE: Performed by: ANESTHESIOLOGY

## 2024-02-15 PROCEDURE — 7100000010 HC PHASE TWO TIME - EACH INCREMENTAL 1 MINUTE: Performed by: STUDENT IN AN ORGANIZED HEALTH CARE EDUCATION/TRAINING PROGRAM

## 2024-02-15 PROCEDURE — 3700000001 HC GENERAL ANESTHESIA TIME - INITIAL BASE CHARGE: Performed by: STUDENT IN AN ORGANIZED HEALTH CARE EDUCATION/TRAINING PROGRAM

## 2024-02-15 PROCEDURE — 7100000001 HC RECOVERY ROOM TIME - INITIAL BASE CHARGE: Performed by: STUDENT IN AN ORGANIZED HEALTH CARE EDUCATION/TRAINING PROGRAM

## 2024-02-15 PROCEDURE — 2500000004 HC RX 250 GENERAL PHARMACY W/ HCPCS (ALT 636 FOR OP/ED): Performed by: ANESTHESIOLOGY

## 2024-02-15 PROCEDURE — 2500000004 HC RX 250 GENERAL PHARMACY W/ HCPCS (ALT 636 FOR OP/ED): Performed by: STUDENT IN AN ORGANIZED HEALTH CARE EDUCATION/TRAINING PROGRAM

## 2024-02-15 PROCEDURE — 2720000007 HC OR 272 NO HCPCS: Performed by: STUDENT IN AN ORGANIZED HEALTH CARE EDUCATION/TRAINING PROGRAM

## 2024-02-15 PROCEDURE — A4217 STERILE WATER/SALINE, 500 ML: HCPCS | Performed by: STUDENT IN AN ORGANIZED HEALTH CARE EDUCATION/TRAINING PROGRAM

## 2024-02-15 PROCEDURE — 2500000004 HC RX 250 GENERAL PHARMACY W/ HCPCS (ALT 636 FOR OP/ED): Performed by: ANESTHESIOLOGIST ASSISTANT

## 2024-02-15 PROCEDURE — 2500000005 HC RX 250 GENERAL PHARMACY W/O HCPCS: Performed by: ANESTHESIOLOGIST ASSISTANT

## 2024-02-15 PROCEDURE — 3700000002 HC GENERAL ANESTHESIA TIME - EACH INCREMENTAL 1 MINUTE: Performed by: STUDENT IN AN ORGANIZED HEALTH CARE EDUCATION/TRAINING PROGRAM

## 2024-02-15 PROCEDURE — 2500000005 HC RX 250 GENERAL PHARMACY W/O HCPCS: Performed by: ANESTHESIOLOGY

## 2024-02-15 PROCEDURE — 7100000002 HC RECOVERY ROOM TIME - EACH INCREMENTAL 1 MINUTE: Performed by: STUDENT IN AN ORGANIZED HEALTH CARE EDUCATION/TRAINING PROGRAM

## 2024-02-15 PROCEDURE — 29826 SHO ARTHRS SRG DECOMPRESSION: CPT | Performed by: STUDENT IN AN ORGANIZED HEALTH CARE EDUCATION/TRAINING PROGRAM

## 2024-02-15 RX ORDER — MIDAZOLAM HYDROCHLORIDE 1 MG/ML
2 INJECTION, SOLUTION INTRAMUSCULAR; INTRAVENOUS ONCE
Status: COMPLETED | OUTPATIENT
Start: 2024-02-15 | End: 2024-02-15

## 2024-02-15 RX ORDER — BUPIVACAINE HYDROCHLORIDE 5 MG/ML
INJECTION, SOLUTION PERINEURAL AS NEEDED
Status: DISCONTINUED | OUTPATIENT
Start: 2024-02-15 | End: 2024-02-15

## 2024-02-15 RX ORDER — SODIUM CHLORIDE, SODIUM LACTATE, POTASSIUM CHLORIDE, CALCIUM CHLORIDE 600; 310; 30; 20 MG/100ML; MG/100ML; MG/100ML; MG/100ML
100 INJECTION, SOLUTION INTRAVENOUS CONTINUOUS
Status: DISCONTINUED | OUTPATIENT
Start: 2024-02-15 | End: 2024-02-15 | Stop reason: HOSPADM

## 2024-02-15 RX ORDER — ONDANSETRON HYDROCHLORIDE 2 MG/ML
4 INJECTION, SOLUTION INTRAVENOUS ONCE AS NEEDED
Status: COMPLETED | OUTPATIENT
Start: 2024-02-15 | End: 2024-02-15

## 2024-02-15 RX ORDER — HYDRALAZINE HYDROCHLORIDE 20 MG/ML
5 INJECTION INTRAMUSCULAR; INTRAVENOUS EVERY 30 MIN PRN
Status: DISCONTINUED | OUTPATIENT
Start: 2024-02-15 | End: 2024-02-15 | Stop reason: HOSPADM

## 2024-02-15 RX ORDER — ASPIRIN 81 MG/1
81 TABLET ORAL DAILY
Qty: 14 TABLET | Refills: 0 | Status: SHIPPED | OUTPATIENT
Start: 2024-02-15 | End: 2024-02-29

## 2024-02-15 RX ORDER — FENTANYL CITRATE 50 UG/ML
100 INJECTION, SOLUTION INTRAMUSCULAR; INTRAVENOUS ONCE
Status: COMPLETED | OUTPATIENT
Start: 2024-02-15 | End: 2024-02-15

## 2024-02-15 RX ORDER — OXYCODONE HYDROCHLORIDE 5 MG/1
5 TABLET ORAL EVERY 6 HOURS PRN
Qty: 28 TABLET | Refills: 0 | Status: SHIPPED | OUTPATIENT
Start: 2024-02-15 | End: 2024-02-22

## 2024-02-15 RX ORDER — LABETALOL HYDROCHLORIDE 5 MG/ML
5 INJECTION, SOLUTION INTRAVENOUS ONCE AS NEEDED
Status: DISCONTINUED | OUTPATIENT
Start: 2024-02-15 | End: 2024-02-15 | Stop reason: HOSPADM

## 2024-02-15 RX ORDER — LIDOCAINE HYDROCHLORIDE 10 MG/ML
INJECTION, SOLUTION EPIDURAL; INFILTRATION; INTRACAUDAL; PERINEURAL AS NEEDED
Status: DISCONTINUED | OUTPATIENT
Start: 2024-02-15 | End: 2024-02-15

## 2024-02-15 RX ORDER — PROPOFOL 10 MG/ML
INJECTION, EMULSION INTRAVENOUS AS NEEDED
Status: DISCONTINUED | OUTPATIENT
Start: 2024-02-15 | End: 2024-02-15

## 2024-02-15 RX ORDER — ONDANSETRON HYDROCHLORIDE 2 MG/ML
INJECTION, SOLUTION INTRAVENOUS AS NEEDED
Status: DISCONTINUED | OUTPATIENT
Start: 2024-02-15 | End: 2024-02-15

## 2024-02-15 RX ORDER — KETOROLAC TROMETHAMINE 30 MG/ML
30 INJECTION, SOLUTION INTRAMUSCULAR; INTRAVENOUS AS NEEDED
Status: COMPLETED | OUTPATIENT
Start: 2024-02-15 | End: 2024-02-15

## 2024-02-15 RX ORDER — CEFAZOLIN SODIUM 2 G/100ML
INJECTION, SOLUTION INTRAVENOUS AS NEEDED
Status: DISCONTINUED | OUTPATIENT
Start: 2024-02-15 | End: 2024-02-15

## 2024-02-15 RX ORDER — ALBUTEROL SULFATE 0.83 MG/ML
2.5 SOLUTION RESPIRATORY (INHALATION) ONCE AS NEEDED
Status: DISCONTINUED | OUTPATIENT
Start: 2024-02-15 | End: 2024-02-15 | Stop reason: HOSPADM

## 2024-02-15 RX ORDER — FENTANYL CITRATE 50 UG/ML
50 INJECTION, SOLUTION INTRAMUSCULAR; INTRAVENOUS EVERY 5 MIN PRN
Status: DISCONTINUED | OUTPATIENT
Start: 2024-02-15 | End: 2024-02-15 | Stop reason: HOSPADM

## 2024-02-15 RX ORDER — ACETAMINOPHEN 500 MG
1000 TABLET ORAL EVERY 6 HOURS PRN
Qty: 30 TABLET | Refills: 2 | Status: SHIPPED | OUTPATIENT
Start: 2024-02-15 | End: 2024-02-26

## 2024-02-15 RX ORDER — DEXAMETHASONE SODIUM PHOSPHATE 4 MG/ML
INJECTION, SOLUTION INTRA-ARTICULAR; INTRALESIONAL; INTRAMUSCULAR; INTRAVENOUS; SOFT TISSUE AS NEEDED
Status: DISCONTINUED | OUTPATIENT
Start: 2024-02-15 | End: 2024-02-15

## 2024-02-15 RX ORDER — DEXAMETHASONE SODIUM PHOSPHATE 10 MG/ML
INJECTION INTRAMUSCULAR; INTRAVENOUS AS NEEDED
Status: DISCONTINUED | OUTPATIENT
Start: 2024-02-15 | End: 2024-02-15

## 2024-02-15 RX ADMIN — ONDANSETRON 4 MG: 2 INJECTION INTRAMUSCULAR; INTRAVENOUS at 13:39

## 2024-02-15 RX ADMIN — LIDOCAINE HYDROCHLORIDE 5 ML: 10 INJECTION, SOLUTION EPIDURAL; INFILTRATION; INTRACAUDAL; PERINEURAL at 12:03

## 2024-02-15 RX ADMIN — FENTANYL CITRATE 100 MCG: 50 INJECTION INTRAMUSCULAR; INTRAVENOUS at 11:01

## 2024-02-15 RX ADMIN — DEXAMETHASONE SODIUM PHOSPHATE 10 MG: 10 INJECTION, SOLUTION INTRAMUSCULAR; INTRAVENOUS at 11:06

## 2024-02-15 RX ADMIN — HYDROMORPHONE HYDROCHLORIDE 0.5 MG: 1 INJECTION, SOLUTION INTRAMUSCULAR; INTRAVENOUS; SUBCUTANEOUS at 13:25

## 2024-02-15 RX ADMIN — KETOROLAC TROMETHAMINE 30 MG: 30 INJECTION INTRAMUSCULAR; INTRAVENOUS at 13:40

## 2024-02-15 RX ADMIN — SODIUM CHLORIDE, SODIUM LACTATE, POTASSIUM CHLORIDE, AND CALCIUM CHLORIDE 100 ML/HR: 600; 310; 30; 20 INJECTION, SOLUTION INTRAVENOUS at 10:33

## 2024-02-15 RX ADMIN — MIDAZOLAM 2 MG: 1 INJECTION INTRAMUSCULAR; INTRAVENOUS at 11:01

## 2024-02-15 RX ADMIN — ONDANSETRON 4 MG: 2 INJECTION INTRAMUSCULAR; INTRAVENOUS at 12:58

## 2024-02-15 RX ADMIN — PROPOFOL 200 MG: 10 INJECTION, EMULSION INTRAVENOUS at 12:03

## 2024-02-15 RX ADMIN — CEFAZOLIN SODIUM 2 G: 2 INJECTION, SOLUTION INTRAVENOUS at 12:03

## 2024-02-15 RX ADMIN — BUPIVACAINE HYDROCHLORIDE 25 ML: 5 INJECTION, SOLUTION PERINEURAL at 11:06

## 2024-02-15 RX ADMIN — FENTANYL CITRATE 50 MCG: 0.05 INJECTION, SOLUTION INTRAMUSCULAR; INTRAVENOUS at 13:30

## 2024-02-15 RX ADMIN — DEXAMETHASONE SODIUM PHOSPHATE 4 MG: 4 INJECTION, SOLUTION INTRAMUSCULAR; INTRAVENOUS at 12:15

## 2024-02-15 SDOH — HEALTH STABILITY: MENTAL HEALTH: CURRENT SMOKER: 1

## 2024-02-15 ASSESSMENT — PAIN DESCRIPTION - DESCRIPTORS: DESCRIPTORS: ACHING;SHOOTING;STABBING

## 2024-02-15 ASSESSMENT — PAIN SCALES - GENERAL
PAINLEVEL_OUTOF10: 2
PAINLEVEL_OUTOF10: 0 - NO PAIN
PAINLEVEL_OUTOF10: 3
PAINLEVEL_OUTOF10: 6
PAINLEVEL_OUTOF10: 0 - NO PAIN
PAINLEVEL_OUTOF10: 2
PAINLEVEL_OUTOF10: 5 - MODERATE PAIN
PAINLEVEL_OUTOF10: 0 - NO PAIN
PAINLEVEL_OUTOF10: 10 - WORST POSSIBLE PAIN
PAINLEVEL_OUTOF10: 0 - NO PAIN
PAIN_LEVEL: 3
PAINLEVEL_OUTOF10: 3

## 2024-02-15 ASSESSMENT — PAIN - FUNCTIONAL ASSESSMENT
PAIN_FUNCTIONAL_ASSESSMENT: 0-10
PAIN_FUNCTIONAL_ASSESSMENT: WONG-BAKER FACES
PAIN_FUNCTIONAL_ASSESSMENT: 0-10

## 2024-02-15 ASSESSMENT — COLUMBIA-SUICIDE SEVERITY RATING SCALE - C-SSRS
6. HAVE YOU EVER DONE ANYTHING, STARTED TO DO ANYTHING, OR PREPARED TO DO ANYTHING TO END YOUR LIFE?: NO
2. HAVE YOU ACTUALLY HAD ANY THOUGHTS OF KILLING YOURSELF?: NO
1. IN THE PAST MONTH, HAVE YOU WISHED YOU WERE DEAD OR WISHED YOU COULD GO TO SLEEP AND NOT WAKE UP?: NO

## 2024-02-15 NOTE — ANESTHESIA PROCEDURE NOTES
Airway  Date/Time: 2/15/2024 12:04 PM  Urgency: elective      Staffing  Performed: ABEL   Authorized by: Willis Pa MD    Performed by: ABEL Dominguez  Patient location during procedure: OR    Indications and Patient Condition  Indications for airway management: anesthesia  Spontaneous Ventilation: absent  Sedation level: deep  Preoxygenated: yes  Mask difficulty assessment: 1 - vent by mask    Final Airway Details  Final airway type: supraglottic airway      Successful airway: Size 4     Number of attempts at approach: 1

## 2024-02-15 NOTE — ANESTHESIA PREPROCEDURE EVALUATION
Patient: Joe Muñoz    Procedure Information       Date/Time: 02/15/24 1130    Procedure: Debridement Shoulder (beach chair, preop block, arthrex anchors) (Left: Shoulder)    Location: [facility] / [facility]    Surgeons: Michael Mesa MD            Relevant Problems   Cardiovascular   (+) Combined hyperlipidemia   (+) Hypertension      GI   (+) Esophageal reflux      Other   (+) 1st MTP arthritis   (+) Impingement syndrome of left shoulder   (+) Lateral epicondylitis of right elbow     Past Surgical History:   Procedure Laterality Date    ELBOW SURGERY Right 2020    OTHER SURGICAL HISTORY Right 1992    Open Treatment Of Fracture Of One Metacarpal Bone       Clinical information reviewed:   Tobacco  Allergies  Meds   Med Hx  Surg Hx   Fam Hx  Soc Hx        NPO Detail:  NPO/Void Status  Carbohydrate Drink Given Prior to Surgery? : N  Date of Last Liquid: 02/14/24  Time of Last Liquid: 2200  Date of Last Solid: 02/14/24  Time of Last Solid: 2200  Last Intake Type: Clear fluids  Time of Last Void: 0930         Physical Exam    Airway  Mallampati: II  TM distance: >3 FB  Neck ROM: full     Cardiovascular   Comments: deferred   Dental    Pulmonary   Comments: deferred   Abdominal     Comments: deferred           Anesthesia Plan    History of general anesthesia?: yes  History of complications of general anesthesia?: no    ASA 2     general and regional     The patient is a current smoker.  Patient was previously instructed to abstain from smoking on day of procedure.  Patient did not smoke on day of procedure.    intravenous induction   Postoperative administration of opioids is intended.  Anesthetic plan and risks discussed with patient.    Plan discussed with CAA.

## 2024-02-15 NOTE — OP NOTE
Debridement Shoulder, BICEPS TENODESIS (L) Operative Note     Date: 2/15/2024  OR Location: [facility]    Name: Joe Muñoz, : 1970, Age: 53 y.o., MRN: [id number], Sex: male    Diagnosis  Pre-op Diagnosis     * Traumatic incomplete tear of left rotator cuff, subsequent encounter [S46.012D]     * Arthritis of left shoulder region [M19.012] Post-op Diagnosis     * Traumatic incomplete tear of left rotator cuff, subsequent encounter [S46.012D]     * Arthritis of left shoulder region [M19.012]     Procedures  Debridement Shoulder, BICEPS TENODESIS  50693 - CA SURGICAL ARTHROSCOPY SHOULDER XTNSV DBRDMT 3+  94497 - subacromial decompression    Surgeons      * Michael Mesa - Primary    Resident/Fellow/Other Assistant:  Surgeon(s) and Role:    Procedure Summary  Anesthesia: Consult  ASA: II  Anesthesia Staff: Anesthesiologist: Willis Pa MD  C-AA: ABEL Dominguez  Estimated Blood Loss: 10 mL  Intra-op Medications:   Administrations occurring from 1130 to 1330 on 02/15/24:   Medication Name Total Dose   EPINEPHrine (Adrenalin) 1 mg in sodium chloride 0.9 % 3,000 mL irrigation Cannot be calculated              Anesthesia Record               Intraprocedure I/O Totals       None           Specimen: No specimens collected     Staff:   Circulator: Thomas Acharya RN  Scrub Person: Brittany Grimm PA-C; Elizabeth Mcintosh; Juani Gold     INDICATIONS FOR PROCEDURE: The patient had significant pain and weakness in the affected extremity which was refractory to nonoperative measures and was diagnosed with early arthritis and degenerative tearing of the labrum, biceps tendinitis of the left shoulder. Treatment options were discussed. The patient was proposed to have a shoulder arthroscopy and related procedures based on preoperative planning and intraoperative findings. Risks and benefits of surgery and alternatives of treatment were discussed.  The patient understood all the risks and benefits and wanted to  proceed with surgical option.    DESCRIPTION OF PROCEDURE: Patient was met in the preoperative holding area. Consent for surgery was reviewed and the correct operative extremity was verified and marked. The patient then underwent a preoperative  nerve block, please see anesthesia records regarding this. The patient was then brought to the operating room and was placed in a supine position on the operating table.  SCDs were placed for DVT prophylaxis. General anesthesia was induced. The patient was placed in a beach chair position to around 90 degrees of inclination, and all the bony prominences were well padded. The operative upper extremity was prepped and draped in usual sterile fashion.  A time out was held confirming the correct patient, operative side, operative procedure, preoperative antibiotics, implants being present and that it was safe to proceed--all were in agreement it was safe to proceed.    Exam under anesthesia: Prior to incision, an EUA showed: Full passive forward flexion, some loss of external rotation, passively can get to about 30    Diagnostic shoulder arthroscopy of the glenohumeral joint: A standard posterolateral arthroscopic portal was made approximately 2 cm medial and inferior to the posterolateral border of the acromion, at the soft spot of the glenohumeral joint. The arthroscope was introduced into the glenohumeral joint. Under arthroscopic visualization, an anterior portal was made in the rotator interval. This was made by first visualizing the trajectory with a spinal needle and then utilizing a cannula from outside to inside to create this anterior portal along a similar path in the same trajectory through a small skin incision.    Diagnostic arthroscopy revealed:   Biceps tendon: Significant synovitis  Subscapularis: Intact  Anterior capsule: Significant synovitis  Glenoid cartilage: Large amount of cartilage wear.  On the anterior inferior portion of the cartilage, it is  essentially completely gone with exposure of subchondral bone  Humeral head cartilage: Irregularity and delamination without exposure of subchondral bone  Anterior labrum: Degenerative tearing  Superior labrum: Degenerative tearing with full peelback  Posterior labrum: Degenerative tearing  Axillary recess: No loose bodies  Inferior glenohumeral ligaments: Intact  Posterosuperior rotator cuff: Intact    A debridement was then performed on anterior capsule, anterior labrum, superior labrum, posterior labrum, glenoid cartilage, humeral head cartilage.  The biceps tendon was released using a electrocautery device for later biceps tenodesis.  Due to the patient's external rotation stiffness, a anterior and inferior capsular release was performed carefully using electrocautery.  Care was taken to preserve neurovascular structures and protect the axillary nerve.    Subacromial bursectomy: Next, the arthroscope was introduced into the subacromial space. There was extensive synovitis and bursitis in this region with associated scarring. An extensive bursectomy and synovectomy was performed in the subacromial space using a combination of 4.5 mm shaver and the RF device through a lateral portal.  The rotator cuff was intact we did perform a full bursectomy as well as ablation of the undersurface of the acromion, and using the shaver, we did perform a gentle acromioplasty.    Attention was then directed to the subpectoral biceps tenodesis.  An incision was made at the inferior portion of the pectoralis major tendon.  Sharp dissection was carried out bluntly to the level of the bicipital groove.  The biceps tendon was then identified.  It was noted that the tendon had essentially auto tenodesed within the groove.  With a large amount of force, the tendon was not able to be released.  Therefore, we were satisfied with the patient's own tenodesis within the groove.  The wound was then fully irrigated.  The bicipital incision was  then closed with 2-0 Vicryl, 3-0 Monocryl, skin glue and a Mepilex dressing.    Closure of incisions:  After completion of a satisfactory debridement, the shoulder subacromial space was once again swept to remove any remaining loose bone fragments, synovitis, and potential scar tissue. Arthroscopic fluid was evacuated from the subacromial space joint and instruments were removed.    The portals were reapproximated and closed with 3-0 nylon suture. We confirmed that all counts were correct at the end of the case prior to and following closure. A clean sterile dressing consisting of xeroform, fluffs, ABD pad, and foam tape was applied. The drapes were then taken down. The patient was then placed into a sling with an abduction pillow prior to being awoken from anesthesia.    Following the procedure, the patient was satisfactorily awakened from anesthesia and transferred to the postanesthesia care unit in stable condition.     ATTESTATION: Dr. Mesa was present for the entirety of the procedure and personally performed all aspects of this procedure.    Dragon software was used to dictate this note, please be aware that minor errors in transcription may be present.    Michael Mesa MD    Shoulder/Elbow Surgery  [facility]        Complications:  None; patient tolerated the procedure well.    Disposition: PACU - hemodynamically stable.  Condition: stable     Attending Attestation: I was present and scrubbed for the entire procedure.    Michael Mesa  Phone Number: [phone]

## 2024-02-15 NOTE — ANESTHESIA PROCEDURE NOTES
Peripheral Block    Patient location during procedure: pre-op  Start time: 2/15/2024 11:06 AM  End time: 2/15/2024 11:08 AM  Reason for block: at surgeon's request and post-op pain management  Staffing  Performed: attending   Authorized by: Willis Pa MD    Performed by: Willis Pa MD  Preanesthetic Checklist  Completed: patient identified, IV checked, site marked, risks and benefits discussed, surgical consent, monitors and equipment checked, pre-op evaluation and timeout performed   Timeout performed at: 2/15/2024 11:00 AM  Peripheral Block  Patient position: laying flat  Prep: alcohol swabs  Patient monitoring: heart rate, cardiac monitor and continuous pulse ox  Block type: interscalene  Injection technique: single-shot  Guidance: nerve stimulator and ultrasound guided  Needle  Needle type: short-bevel   Needle gauge: 22 G  Needle length: 5 cm  Needle localization: anatomical landmarks, ultrasound guidance and nerve stimulator  Needle insertion depth: 4 cm  Assessment  Injection assessment: negative aspiration for heme, no paresthesia on injection, incremental injection and local visualized surrounding nerve on ultrasound  Paresthesia pain: none  Heart rate change: no  Slow fractionated injection: yes  Additional Notes  Dexamethasone 10 mg added to local

## 2024-02-15 NOTE — ANESTHESIA POSTPROCEDURE EVALUATION
Patient: Joe Muñoz    Procedure Summary       Date: 02/15/24 Room / Location: [facility] / [facility]    Anesthesia Start: 1159 Anesthesia Stop: 1310    Procedure: Debridement Shoulder, BICEPS TENODESIS (Left: Shoulder) Diagnosis:       Traumatic incomplete tear of left rotator cuff, subsequent encounter      Arthritis of left shoulder region      (Traumatic incomplete tear of left rotator cuff, subsequent encounter [S46.012D])      (Arthritis of left shoulder region [M19.012])    Surgeons: Michael Mesa MD Responsible Provider: Willis Pa MD    Anesthesia Type: general, regional ASA Status: 2            Anesthesia Type: general, regional    Vitals Value Taken Time   /96 02/15/24 1342   Temp 36 02/15/24 1342   Pulse 80 02/15/24 1342   Resp 16 02/15/24 1342   SpO2 100 02/15/24 1342       Anesthesia Post Evaluation    Patient location during evaluation: PACU  Patient participation: complete - patient participated  Level of consciousness: awake and alert  Pain score: 3  Pain management: satisfactory to patient  Airway patency: patent  Cardiovascular status: hemodynamically stable  Respiratory status: acceptable  Hydration status: acceptable  Postoperative Nausea and Vomiting: none  Comments: PONV absent        There were no known notable events for this encounter.

## 2024-03-05 ENCOUNTER — OFFICE VISIT (OUTPATIENT)
Dept: ORTHOPEDIC SURGERY | Facility: CLINIC | Age: 54
End: 2024-03-05
Payer: COMMERCIAL

## 2024-03-05 DIAGNOSIS — S46.012D TRAUMATIC INCOMPLETE TEAR OF LEFT ROTATOR CUFF, SUBSEQUENT ENCOUNTER: Primary | ICD-10-CM

## 2024-03-05 PROCEDURE — 99024 POSTOP FOLLOW-UP VISIT: CPT | Performed by: STUDENT IN AN ORGANIZED HEALTH CARE EDUCATION/TRAINING PROGRAM

## 2024-03-05 NOTE — PROGRESS NOTES
History: Patient returns to the office status post L BT and debridement on 3 weeks ago. Patient has been immobilized in a sling and pain is well controlled. Denies numbness/tingling.     Past medical history, past surgical history, medications, allergies, family history, social history, and review of systems were reviewed today and have been documented separately in this encounter.   A 12 point review of systems was negative other than as stated in the HPI.    Physical Examination:    On exam of the left upper extremity, incisions are well healed, without significant erythema or drainage, sutures were removed today. Demonstrates full ROM of the elbow/wrist/hand. Neurovascularly intact throughout.    Assessment: 3 weeks s/p L BT and debridement     Plan: At this point we will start PT. Follow up in 4 weeks. All questions answered.     Dragon software was used to dictate this note, please be aware that minor errors in transcription may be present.    Michael Mesa MD    Shoulder/Elbow Surgery  [facility]

## 2024-03-25 DIAGNOSIS — I10 PRIMARY HYPERTENSION: ICD-10-CM

## 2024-03-25 RX ORDER — HYDROCHLOROTHIAZIDE 25 MG/1
25 TABLET ORAL DAILY
Qty: 90 TABLET | Refills: 0 | Status: SHIPPED | OUTPATIENT
Start: 2024-03-25

## 2024-04-01 ENCOUNTER — EVALUATION (OUTPATIENT)
Dept: PHYSICAL THERAPY | Facility: CLINIC | Age: 54
End: 2024-04-01
Payer: COMMERCIAL

## 2024-04-01 DIAGNOSIS — S46.012D TRAUMATIC INCOMPLETE TEAR OF LEFT ROTATOR CUFF, SUBSEQUENT ENCOUNTER: ICD-10-CM

## 2024-04-01 PROCEDURE — 97140 MANUAL THERAPY 1/> REGIONS: CPT | Mod: GP

## 2024-04-01 PROCEDURE — 97110 THERAPEUTIC EXERCISES: CPT | Mod: GP

## 2024-04-01 PROCEDURE — 97161 PT EVAL LOW COMPLEX 20 MIN: CPT | Mod: GP

## 2024-04-01 NOTE — PROGRESS NOTES
Physical Therapy    Physical Therapy Evaluation and Treatment      Patient Name: Joe Muñoz  MRN: [id number]  Today's Date: 4/1/2024  Time Calculation  Start Time: 1300  Stop Time: 1345  Time Calculation (min): 45 min  PT Evaluation Time Entry  PT Evaluation (Low) Time Entry: 15  PT Therapeutic Procedures Time Entry  Manual Therapy Time Entry: 10  Therapeutic Exercise Time Entry: 20  Visit: 1    Assessment:  PT Assessment  Assessment Comment: Patient is a 52 y/o male who was referred to outpatient physical therapy due to left biceps tenodesis and debridement on 2/15/24. He will benefit from medically necessary skilled physical therapy interventions in order to regain normal function of his left shoulder.     Plan:  OP PT Plan  Treatment/Interventions: Cryotherapy, Education/ Instruction, Electrical stimulation, Hot pack, Manual therapy, Neuromuscular re-education, Therapeutic activities, Therapeutic exercises  PT Plan: Skilled PT  PT Frequency: 1 time per week  Duration: 8 weeks  Onset Date: 02/15/24  Certification Period Start Date: 04/01/24  Certification Period End Date: 06/30/24  Number of Treatments Authorized: 90  Rehab Potential: Excellent  Plan of Care Agreement: Patient    Current Problem:   1. Traumatic incomplete tear of left rotator cuff, subsequent encounter  Referral to Physical Therapy          Subjective    General:  General  Reason for Referral: s/p L shoulder arthroscopy  Referred By: Michael Mesa MD  Preferred Learning Style: kinesthetic, verbal, visual, written  General Comment: Patient is a 52 y/o male who was referred to outpatient physical therapy due to left biceps tenodesis and debridement on 2/15/24. The patient arrives to physical therapy wearing a sling. He is using Tylenol for pain relief. The patient is right handed. Today he rates his pain at 8/10.  Precautions:  Precautions  STEADI Fall Risk Score (The score of 4 or more indicates an increased risk of falling): 0  Pain:    "8/10  Prior Level of Function:   Independent with all ADLs.     Objective   Cognition:   WNL  General Assessments:  Range of Motion Comments: Right shoulder ROM  Flexion 170  Extension 55  Abduction 170  ER T2  IR T8  Left shoulder ROM  Flexion 30  Extension 15  Abduction 20  ER 20  IR abdomen    Strength Comments: Right shoulder strength   Flexion 5/5  Extension 5/5  Abduction 5/5  ER 5/5  IR 5/5  Left shoulder strength   Flexion 3/5  Extension 3/5  Abduction 3/5  ER 3/5  IR 3/5    Outcome Measures:  QuickDASH: 84% Disability     Treatments:  Therapeutic Exercise  Therapeutic Exercise Performed: Yes  Therapeutic Exercise Activity 1: pendulums 2 x 30\"  Therapeutic Exercise Activity 2: scapular retractions x 10  Therapeutic Exercise Activity 3: shoulder rolls x 10  Therapeutic Exercise Activity 4: AAROM with dowel ender x 10 in all directions  Therapeutic Exercise Activity 5: isometrics 10 x 5\" in all directions    Manual Therapy  Manual Therapy Performed: Yes  Manual Therapy Activity 1: PROM to the left shoulder    EDUCATION:   Home exercise program 2-3 times per day.     Goals:  Active       PT Problem       Patient will report compliance with his home exercise program.        Start:  04/01/24    Expected End:  06/30/24            Patient will report improvement through the QuickDASH to show improvement in activity tolerance.          Start:  04/01/24    Expected End:  06/30/24            Patient will demonstrate improvements in left  shoulder flexion and abduction to 170 degrees to facilitate reaching into a tall cabinet.        Start:  04/01/24    Expected End:  06/30/24            Patient will demonstrate improvement in left  shoulder strength to 5/5 to facilitate lifting and carrying.        Start:  04/01/24    Expected End:  06/30/24                      "

## 2024-04-02 ENCOUNTER — OFFICE VISIT (OUTPATIENT)
Dept: ORTHOPEDIC SURGERY | Facility: CLINIC | Age: 54
End: 2024-04-02
Payer: COMMERCIAL

## 2024-04-02 VITALS — BODY MASS INDEX: 26.49 KG/M2 | WEIGHT: 159 LBS | HEIGHT: 65 IN

## 2024-04-02 DIAGNOSIS — M19.012 ARTHRITIS OF LEFT SHOULDER REGION: ICD-10-CM

## 2024-04-02 DIAGNOSIS — S46.012D TRAUMATIC INCOMPLETE TEAR OF LEFT ROTATOR CUFF, SUBSEQUENT ENCOUNTER: Primary | ICD-10-CM

## 2024-04-02 PROCEDURE — 99024 POSTOP FOLLOW-UP VISIT: CPT | Performed by: STUDENT IN AN ORGANIZED HEALTH CARE EDUCATION/TRAINING PROGRAM

## 2024-04-02 ASSESSMENT — PAIN DESCRIPTION - DESCRIPTORS: DESCRIPTORS: ACHING;DISCOMFORT;THROBBING

## 2024-04-02 ASSESSMENT — PAIN - FUNCTIONAL ASSESSMENT: PAIN_FUNCTIONAL_ASSESSMENT: 0-10

## 2024-04-02 ASSESSMENT — PAIN SCALES - GENERAL: PAINLEVEL_OUTOF10: 6

## 2024-04-02 NOTE — PROGRESS NOTES
History: Patient returns to the office status post L BT and debridement on 6 weeks ago. Denies numbness/tingling. Doing well, pain controlled. Just started PT yesterday.     Past medical history, past surgical history, medications, allergies, family history, social history, and review of systems were reviewed today and have been documented separately in this encounter.   A 12 point review of systems was negative other than as stated in the HPI.    Physical Examination:    On exam of the left upper extremity, incisions are well healed, without significant erythema or drainage. Demonstrates full ROM of the elbow/wrist/hand. Neurovascularly intact throughout. Passive ROM of the operative shoulder , ER 30.   Assessment: 6 weeks s/p L BT and debridement for OA     Plan:  Patient is doing well, continue PT and ROM and strengthening as tolerated. We will see them back around 3 months postoperatively. All questions answered. Patient is not ready to go back to work yet. We will re-evaluate him in 2 months.       Dragon software was used to dictate this note, please be aware that minor errors in transcription may be present.    Michael Mesa MD    Shoulder/Elbow Surgery  [facility]

## 2024-04-15 ENCOUNTER — TREATMENT (OUTPATIENT)
Dept: PHYSICAL THERAPY | Facility: CLINIC | Age: 54
End: 2024-04-15
Payer: COMMERCIAL

## 2024-04-15 DIAGNOSIS — E78.5 HYPERLIPIDEMIA, UNSPECIFIED: ICD-10-CM

## 2024-04-15 DIAGNOSIS — I10 ESSENTIAL (PRIMARY) HYPERTENSION: ICD-10-CM

## 2024-04-15 PROCEDURE — 97140 MANUAL THERAPY 1/> REGIONS: CPT | Mod: GP

## 2024-04-15 PROCEDURE — 97110 THERAPEUTIC EXERCISES: CPT | Mod: GP

## 2024-04-15 NOTE — PROGRESS NOTES
"Physical Therapy    Physical Therapy Treatment    Patient Name: Joe Muñoz  MRN: [id number]  Today's Date: 4/15/2024  Time Calculation  Start Time: 1430  Stop Time: 1510  Time Calculation (min): 40 min     PT Therapeutic Procedures Time Entry  Manual Therapy Time Entry: 15  Therapeutic Exercise Time Entry: 25  Visit: 2    Assessment:  PT Assessment  Assessment Comment: The patient tolerated the manual therapy and therapeutic exercise well this session.  Plan:  OP PT Plan  Treatment/Interventions: Cryotherapy, Education/ Instruction, Electrical stimulation, Hot pack, Manual therapy, Neuromuscular re-education, Therapeutic activities, Therapeutic exercises  PT Plan: Skilled PT  PT Frequency: 1 time per week  Duration: 8 weeks  Onset Date: 02/15/24  Certification Period Start Date: 04/01/24  Certification Period End Date: 06/30/24  Number of Treatments Authorized: 90  Rehab Potential: Excellent  Plan of Care Agreement: Patient    Current Problem  1. Essential (primary) hypertension  CT cardiac scoring wo IV contrast      2. Hyperlipidemia, unspecified  CT cardiac scoring wo IV contrast          Subjective:    General  Reason for Referral: s/p L shoulder arthroscopy  Referred By: Michael Mesa MD  Preferred Learning Style: kinesthetic, verbal, visual, written  General Comment: Patient reports compliance with his home exercise program.    Objective     Treatments:  Therapeutic Exercise  Therapeutic Exercise Performed: Yes  Therapeutic Exercise Activity 1: pendulums 2 x 30\"  Therapeutic Exercise Activity 2: scapular retractions x 10  Therapeutic Exercise Activity 3: shoulder rolls x 10  Therapeutic Exercise Activity 4: AAROM with dowel ender x 10 in all directions  Therapeutic Exercise Activity 5: isometrics 2 x 10 x 5\" in all directions  Therapeutic Exercise Activity 6: pulleys x 3'    Manual Therapy  Manual Therapy Performed: Yes  Manual Therapy Activity 1: PROM to the left shoulder    Goals:  Active       PT Problem  "      Patient will report compliance with his home exercise program.        Start:  04/01/24    Expected End:  06/30/24            Patient will report improvement through the QuickDASH to show improvement in activity tolerance.          Start:  04/01/24    Expected End:  06/30/24            Patient will demonstrate improvements in left  shoulder flexion and abduction to 170 degrees to facilitate reaching into a tall cabinet.        Start:  04/01/24    Expected End:  06/30/24            Patient will demonstrate improvement in left  shoulder strength to 5/5 to facilitate lifting and carrying.        Start:  04/01/24    Expected End:  06/30/24

## 2024-04-23 ENCOUNTER — TREATMENT (OUTPATIENT)
Dept: PHYSICAL THERAPY | Facility: CLINIC | Age: 54
End: 2024-04-23
Payer: COMMERCIAL

## 2024-04-23 DIAGNOSIS — S46.012D TRAUMATIC INCOMPLETE TEAR OF LEFT ROTATOR CUFF, SUBSEQUENT ENCOUNTER: Primary | ICD-10-CM

## 2024-04-23 PROCEDURE — 97110 THERAPEUTIC EXERCISES: CPT | Mod: GP

## 2024-04-23 PROCEDURE — 97140 MANUAL THERAPY 1/> REGIONS: CPT | Mod: GP

## 2024-04-23 NOTE — PROGRESS NOTES
"Physical Therapy    Physical Therapy Treatment    Patient Name: Joe Muñoz  MRN: [id number]  Today's Date: 4/23/2024  Time Calculation  Start Time: 1505  Stop Time: 1545  Time Calculation (min): 40 min     PT Therapeutic Procedures Time Entry  Manual Therapy Time Entry: 15  Therapeutic Exercise Time Entry: 25  Visit: 3    Assessment:  PT Assessment  Assessment Comment: The patient tolerated the manual therapy and therapeutic exercise well this session.  Plan:  OP PT Plan  Treatment/Interventions: Cryotherapy, Education/ Instruction, Electrical stimulation, Hot pack, Manual therapy, Neuromuscular re-education, Therapeutic activities, Therapeutic exercises  PT Plan: Skilled PT  PT Frequency: 1 time per week  Duration: 8 weeks  Onset Date: 02/15/24  Certification Period Start Date: 04/01/24  Certification Period End Date: 06/30/24  Number of Treatments Authorized: 90  Rehab Potential: Excellent  Plan of Care Agreement: Patient    Current Problem  1. Traumatic incomplete tear of left rotator cuff, subsequent encounter            Subjective:    General  Reason for Referral: s/p L shoulder arthroscopy  Referred By: Michael Mesa MD  Preferred Learning Style: kinesthetic, verbal, visual, written  General Comment: Patient reports compliance with his home exercise program.    Objective     Treatments:  Therapeutic Exercise  Therapeutic Exercise Performed: Yes  Therapeutic Exercise Activity 1: pendulums 2 x 30\"  Therapeutic Exercise Activity 2: scapular retractions x 10  Therapeutic Exercise Activity 3: shoulder rolls x 10  Therapeutic Exercise Activity 4: AAROM with dowel ender x 10 in all directions  Therapeutic Exercise Activity 5: isometrics 2 x 10 x 5\" in all directions  Therapeutic Exercise Activity 6: pulleys x 3'    Manual Therapy  Manual Therapy Performed: Yes  Manual Therapy Activity 1: PROM to the left shoulder    Goals:  Active       PT Problem       Patient will report compliance with his home exercise program. "        Start:  04/01/24    Expected End:  06/30/24            Patient will report improvement through the QuickDASH to show improvement in activity tolerance.          Start:  04/01/24    Expected End:  06/30/24            Patient will demonstrate improvements in left  shoulder flexion and abduction to 170 degrees to facilitate reaching into a tall cabinet.        Start:  04/01/24    Expected End:  06/30/24            Patient will demonstrate improvement in left  shoulder strength to 5/5 to facilitate lifting and carrying.        Start:  04/01/24    Expected End:  06/30/24

## 2024-04-30 ENCOUNTER — TREATMENT (OUTPATIENT)
Dept: PHYSICAL THERAPY | Facility: CLINIC | Age: 54
End: 2024-04-30
Payer: COMMERCIAL

## 2024-04-30 DIAGNOSIS — S46.012D TRAUMATIC INCOMPLETE TEAR OF LEFT ROTATOR CUFF, SUBSEQUENT ENCOUNTER: Primary | ICD-10-CM

## 2024-04-30 PROCEDURE — 97110 THERAPEUTIC EXERCISES: CPT | Mod: GP

## 2024-04-30 PROCEDURE — 97140 MANUAL THERAPY 1/> REGIONS: CPT | Mod: GP

## 2024-04-30 NOTE — PROGRESS NOTES
"Physical Therapy    Physical Therapy Treatment    Patient Name: Joe Muñoz  MRN: [id number]  Today's Date: 4/30/2024  Time Calculation  Start Time: 1430  Stop Time: 1515  Time Calculation (min): 45 min     PT Therapeutic Procedures Time Entry  Manual Therapy Time Entry: 15  Therapeutic Exercise Time Entry: 30  Visit: 4    Assessment:  PT Assessment  Assessment Comment: The patient tolerated the manual therapy and therapeutic exercise well this session.  Plan:  OP PT Plan  Treatment/Interventions: Cryotherapy, Education/ Instruction, Electrical stimulation, Hot pack, Manual therapy, Neuromuscular re-education, Therapeutic activities, Therapeutic exercises  PT Plan: Skilled PT  PT Frequency: 1 time per week  Duration: 8 weeks  Onset Date: 02/15/24  Certification Period Start Date: 04/01/24  Certification Period End Date: 06/30/24  Number of Treatments Authorized: 90  Rehab Potential: Excellent  Plan of Care Agreement: Patient    Current Problem  1. Traumatic incomplete tear of left rotator cuff, subsequent encounter            Subjective   General  Reason for Referral: s/p L shoulder arthroscopy  Referred By: Michael Mesa MD  Preferred Learning Style: kinesthetic, verbal, visual, written  General Comment: Patient reports compliance with his home exercise program.    Objective     Treatments:  Therapeutic Exercise  Therapeutic Exercise Performed: Yes  Therapeutic Exercise Activity 1: pendulums 2 x 30\"  Therapeutic Exercise Activity 2: scapular retractions x 10  Therapeutic Exercise Activity 3: shoulder rolls x 10  Therapeutic Exercise Activity 4: AAROM with dowel ender 2 x 10 in all directions  Therapeutic Exercise Activity 5: isometrics 2 x 10 x 5\" in all directions  Therapeutic Exercise Activity 6: pulleys x 3'  Therapeutic Exercise Activity 7: rows yellow TB 2 x 10    Manual Therapy  Manual Therapy Performed: Yes  Manual Therapy Activity 1: PROM to the left shoulder    Goals:  Active       PT Problem       " Patient will report compliance with his home exercise program.        Start:  04/01/24    Expected End:  06/30/24            Patient will report improvement through the QuickDASH to show improvement in activity tolerance.          Start:  04/01/24    Expected End:  06/30/24            Patient will demonstrate improvements in left  shoulder flexion and abduction to 170 degrees to facilitate reaching into a tall cabinet.        Start:  04/01/24    Expected End:  06/30/24            Patient will demonstrate improvement in left  shoulder strength to 5/5 to facilitate lifting and carrying.        Start:  04/01/24    Expected End:  06/30/24

## 2024-05-02 DIAGNOSIS — I10 PRIMARY HYPERTENSION: ICD-10-CM

## 2024-05-02 RX ORDER — LISINOPRIL 40 MG/1
40 TABLET ORAL DAILY
Qty: 90 TABLET | Refills: 0 | Status: SHIPPED | OUTPATIENT
Start: 2024-05-02

## 2024-05-07 ENCOUNTER — TREATMENT (OUTPATIENT)
Dept: PHYSICAL THERAPY | Facility: CLINIC | Age: 54
End: 2024-05-07
Payer: COMMERCIAL

## 2024-05-07 DIAGNOSIS — S46.012D TRAUMATIC INCOMPLETE TEAR OF LEFT ROTATOR CUFF, SUBSEQUENT ENCOUNTER: Primary | ICD-10-CM

## 2024-05-07 PROCEDURE — 97110 THERAPEUTIC EXERCISES: CPT | Mod: GP

## 2024-05-07 PROCEDURE — 97140 MANUAL THERAPY 1/> REGIONS: CPT | Mod: GP

## 2024-05-07 NOTE — PROGRESS NOTES
"Physical Therapy    Physical Therapy Treatment    Patient Name: Joe Muñoz  MRN: [id number]  Today's Date: 5/7/2024  Time Calculation  Start Time: 1430  Stop Time: 1515  Time Calculation (min): 45 min     PT Therapeutic Procedures Time Entry  Manual Therapy Time Entry: 15  Therapeutic Exercise Time Entry: 30  Visit: 5    Assessment:  PT Assessment  Assessment Comment: The patient tolerated the manual therapy and therapeutic exercise well this session.  Plan:  OP PT Plan  Treatment/Interventions: Cryotherapy, Education/ Instruction, Electrical stimulation, Hot pack, Manual therapy, Neuromuscular re-education, Therapeutic activities, Therapeutic exercises  PT Plan: Skilled PT  PT Frequency: 1 time per week  Duration: 8 weeks  Onset Date: 02/15/24  Certification Period Start Date: 04/01/24  Certification Period End Date: 06/30/24  Number of Treatments Authorized: 90  Rehab Potential: Excellent  Plan of Care Agreement: Patient    Current Problem  1. Traumatic incomplete tear of left rotator cuff, subsequent encounter            Subjective:    General  Reason for Referral: s/p L shoulder arthroscopy  Referred By: Michael Mesa MD  Preferred Learning Style: kinesthetic, verbal, visual, written  General Comment: Patient reports compliance with his home exercise program.    Objective     Treatments:  Therapeutic Exercise  Therapeutic Exercise Performed: Yes  Therapeutic Exercise Activity 1: pendulums 2 x 30\"  Therapeutic Exercise Activity 2: scapular retractions x 10  Therapeutic Exercise Activity 3: shoulder rolls x 10  Therapeutic Exercise Activity 4: AAROM with dowel ender 2 x 10 in all directions  Therapeutic Exercise Activity 5: UBE x 5'  Therapeutic Exercise Activity 6: pulleys x 3'  Therapeutic Exercise Activity 7: rows yellow TB 2 x 10  Therapeutic Exercise Activity 8: shoulder extension yellow TB 2 x 10  Therapeutic Exercise Activity 9: ER yellow TB 2 x 10  Therapeutic Exercise Activity 10: IR yellow TB 2 x " 10    Manual Therapy  Manual Therapy Performed: Yes  Manual Therapy Activity 1: PROM to the left shoulder    Goals:  Active       PT Problem       Patient will report compliance with his home exercise program.        Start:  04/01/24    Expected End:  06/30/24            Patient will report improvement through the QuickDASH to show improvement in activity tolerance.          Start:  04/01/24    Expected End:  06/30/24            Patient will demonstrate improvements in left  shoulder flexion and abduction to 170 degrees to facilitate reaching into a tall cabinet.        Start:  04/01/24    Expected End:  06/30/24            Patient will demonstrate improvement in left  shoulder strength to 5/5 to facilitate lifting and carrying.        Start:  04/01/24    Expected End:  06/30/24

## 2024-05-14 ENCOUNTER — TREATMENT (OUTPATIENT)
Dept: PHYSICAL THERAPY | Facility: CLINIC | Age: 54
End: 2024-05-14
Payer: COMMERCIAL

## 2024-05-14 DIAGNOSIS — S46.012D TRAUMATIC INCOMPLETE TEAR OF LEFT ROTATOR CUFF, SUBSEQUENT ENCOUNTER: Primary | ICD-10-CM

## 2024-05-14 PROCEDURE — 97140 MANUAL THERAPY 1/> REGIONS: CPT | Mod: GP

## 2024-05-14 PROCEDURE — 97110 THERAPEUTIC EXERCISES: CPT | Mod: GP

## 2024-05-14 NOTE — PROGRESS NOTES
"Physical Therapy    Physical Therapy Treatment    Patient Name: Joe Muñoz  MRN: [id number]    Today's Date: 5/14/2024  Time Calculation  Start Time: 1430  Stop Time: 1515  Time Calculation (min): 45 min     PT Therapeutic Procedures Time Entry  Manual Therapy Time Entry: 15  Therapeutic Exercise Time Entry: 30  Visit: 6    Assessment:  PT Assessment  Assessment Comment: The patient tolerated the manual therapy and therapeutic exercise well this session.  Plan:  OP PT Plan  Treatment/Interventions: Cryotherapy, Education/ Instruction, Electrical stimulation, Hot pack, Manual therapy, Neuromuscular re-education, Therapeutic activities, Therapeutic exercises  PT Plan: Skilled PT  PT Frequency: 1 time per week  Duration: 8 weeks  Onset Date: 02/15/24  Certification Period Start Date: 04/01/24  Certification Period End Date: 06/30/24  Number of Treatments Authorized: 90  Rehab Potential: Excellent  Plan of Care Agreement: Patient    Current Problem  1. Traumatic incomplete tear of left rotator cuff, subsequent encounter            Subjective:    General  Reason for Referral: s/p L shoulder arthroscopy  Referred By: Michael Mesa MD  Preferred Learning Style: kinesthetic, verbal, visual, written  General Comment: Patient reports compliance with his home exercise program.    Objective     Treatments:  Therapeutic Exercise  Therapeutic Exercise Performed: Yes  Therapeutic Exercise Activity 1: pendulums 2 x 30\"  Therapeutic Exercise Activity 2: scapular retractions x 10  Therapeutic Exercise Activity 3: shoulder rolls x 10  Therapeutic Exercise Activity 4: AAROM with dowel ender 2 x 10 in all directions  Therapeutic Exercise Activity 5: UBE x 5'  Therapeutic Exercise Activity 6: pulleys x 3'  Therapeutic Exercise Activity 7: rows yellow TB 2 x 10  Therapeutic Exercise Activity 8: shoulder extension yellow TB 2 x 10  Therapeutic Exercise Activity 9: ER yellow TB 2 x 10  Therapeutic Exercise Activity 10: IR yellow TB 2 x " 10    Manual Therapy  Manual Therapy Performed: Yes  Manual Therapy Activity 1: PROM to the left shoulder    Goals:  Active       PT Problem       Patient will report compliance with his home exercise program.        Start:  04/01/24    Expected End:  06/30/24            Patient will report improvement through the QuickDASH to show improvement in activity tolerance.          Start:  04/01/24    Expected End:  06/30/24            Patient will demonstrate improvements in left  shoulder flexion and abduction to 170 degrees to facilitate reaching into a tall cabinet.        Start:  04/01/24    Expected End:  06/30/24            Patient will demonstrate improvement in left  shoulder strength to 5/5 to facilitate lifting and carrying.        Start:  04/01/24    Expected End:  06/30/24

## 2024-05-29 ENCOUNTER — OFFICE VISIT (OUTPATIENT)
Dept: ORTHOPEDIC SURGERY | Facility: CLINIC | Age: 54
End: 2024-05-29
Payer: COMMERCIAL

## 2024-05-29 DIAGNOSIS — S46.012D TRAUMATIC INCOMPLETE TEAR OF LEFT ROTATOR CUFF, SUBSEQUENT ENCOUNTER: Primary | ICD-10-CM

## 2024-05-29 DIAGNOSIS — M19.012 ARTHRITIS OF LEFT SHOULDER REGION: ICD-10-CM

## 2024-05-29 PROCEDURE — 99212 OFFICE O/P EST SF 10 MIN: CPT | Performed by: STUDENT IN AN ORGANIZED HEALTH CARE EDUCATION/TRAINING PROGRAM

## 2024-05-29 ASSESSMENT — PAIN DESCRIPTION - DESCRIPTORS: DESCRIPTORS: ACHING

## 2024-05-29 ASSESSMENT — PAIN SCALES - GENERAL: PAINLEVEL_OUTOF10: 4

## 2024-05-29 ASSESSMENT — PAIN - FUNCTIONAL ASSESSMENT: PAIN_FUNCTIONAL_ASSESSMENT: 0-10

## 2024-05-29 NOTE — PROGRESS NOTES
History: Patient returns to the office status post L BT and debridement on 3 months ago. Denies numbness/tingling. Doing well, pain controlled. Working with PT.     Past medical history, past surgical history, medications, allergies, family history, social history, and review of systems were reviewed today and have been documented separately in this encounter.   A 12 point review of systems was negative other than as stated in the HPI.    Physical Examination:    On exam of the left upper extremity, incisions are well healed, without significant erythema or drainage. Demonstrates full ROM of the elbow/wrist/hand. Neurovascularly intact throughout. Active ROM of the operative shoulder , Er 50, IR L5.     Assessment: 3 months s/p L BT and debridement for OA     Plan:  Patient is doing well, continue PT and ROM and strengthening as tolerated. 5 pound lifting restriction as he works on strengthening. We will see them back around 6 months postoperatively. All questions answered. Target return to work date of 8/15/24.       Dragon software was used to dictate this note, please be aware that minor errors in transcription may be present.    Michael Mesa MD    Shoulder/Elbow Surgery  [facility]

## 2024-06-06 DIAGNOSIS — Z12.11 ENCOUNTER FOR SCREENING COLONOSCOPY: Primary | ICD-10-CM

## 2024-06-10 ENCOUNTER — TELEPHONE (OUTPATIENT)
Dept: PRIMARY CARE | Facility: CLINIC | Age: 54
End: 2024-06-10
Payer: COMMERCIAL

## 2024-06-11 ENCOUNTER — TREATMENT (OUTPATIENT)
Dept: PHYSICAL THERAPY | Facility: CLINIC | Age: 54
End: 2024-06-11
Payer: COMMERCIAL

## 2024-06-11 DIAGNOSIS — S46.012D TRAUMATIC INCOMPLETE TEAR OF LEFT ROTATOR CUFF, SUBSEQUENT ENCOUNTER: Primary | ICD-10-CM

## 2024-06-11 PROCEDURE — 97110 THERAPEUTIC EXERCISES: CPT | Mod: GP

## 2024-06-11 NOTE — PROGRESS NOTES
Physical Therapy    Physical Therapy Treatment    Patient Name: Joe Muñoz  MRN: [id number]    Today's Date: 6/11/2024  Time Calculation  Start Time: 1340  Stop Time: 1425  Time Calculation (min): 45 min     PT Therapeutic Procedures Time Entry  Therapeutic Exercise Time Entry: 45  Visit: 7    Assessment:  PT Assessment  Assessment Comment: The patient tolerated the strengthening well this session.  Plan:  OP PT Plan  Treatment/Interventions: Cryotherapy, Education/ Instruction, Electrical stimulation, Hot pack, Manual therapy, Neuromuscular re-education, Therapeutic activities, Therapeutic exercises  PT Plan: Skilled PT  PT Frequency: 1 time per week  Duration: 8 weeks  Onset Date: 02/15/24  Certification Period Start Date: 04/01/24  Certification Period End Date: 06/30/24  Number of Treatments Authorized: 90  Rehab Potential: Excellent  Plan of Care Agreement: Patient    Current Problem  1. Traumatic incomplete tear of left rotator cuff, subsequent encounter  Follow Up In Physical Therapy          Subjective:   General  Reason for Referral: s/p L shoulder arthroscopy  Referred By: Michael Mesa MD  Preferred Learning Style: kinesthetic, verbal, visual, written  General Comment: Patient reports his last follow up with the doctor went well. Potential return to work in August.    Objective     Treatments:  Therapeutic Exercise  Therapeutic Exercise Performed: Yes  Therapeutic Exercise Activity 1: UBE x 5'  Therapeutic Exercise Activity 2: pulleys x 3'  Therapeutic Exercise Activity 3: rows red TB 3 x 10  Therapeutic Exercise Activity 4: shoulder extension red TB 3 x 10  Therapeutic Exercise Activity 5: ER with red TB 3 x 10  Therapeutic Exercise Activity 6: IR with red TB 3 x 10  Therapeutic Exercise Activity 7: standing I,T,Y 1# 2 x 10 ea  Therapeutic Exercise Activity 8: prone extension 1# 2 x 10  Therapeutic Exercise Activity 9: prone rows 2# 2 x 10  Therapeutic Exercise Activity 10: prone horizontal abduction  1# 2 x 10  Therapeutic Exercise Activity 11: prone scaption 1# 2 x 10  Therapeutic Exercise Activity 12: side lying ER 2# 2 x 10  Therapeutic Exercise Activity 13: side lying abduction to 90 degrees 2# 2 x 10  Therapeutic Exercise Activity 14: ball on wall scapular stabilization x 20 ea  Therapeutic Exercise Activity 15: chest press 4# 3 x 10  Therapeutic Exercise Activity 16: bicep curls 2# 3 x 10    Goals:  Active       PT Problem       Patient will report compliance with his home exercise program.        Start:  04/01/24    Expected End:  06/30/24            Patient will report improvement through the QuickDASH to show improvement in activity tolerance.          Start:  04/01/24    Expected End:  06/30/24            Patient will demonstrate improvements in left  shoulder flexion and abduction to 170 degrees to facilitate reaching into a tall cabinet.        Start:  04/01/24    Expected End:  06/30/24            Patient will demonstrate improvement in left  shoulder strength to 5/5 to facilitate lifting and carrying.        Start:  04/01/24    Expected End:  06/30/24

## 2024-06-13 ENCOUNTER — TELEPHONE (OUTPATIENT)
Dept: PRIMARY CARE | Facility: CLINIC | Age: 54
End: 2024-06-13
Payer: COMMERCIAL

## 2024-06-18 ENCOUNTER — TREATMENT (OUTPATIENT)
Dept: PHYSICAL THERAPY | Facility: CLINIC | Age: 54
End: 2024-06-18
Payer: COMMERCIAL

## 2024-06-18 DIAGNOSIS — S46.012D TRAUMATIC INCOMPLETE TEAR OF LEFT ROTATOR CUFF, SUBSEQUENT ENCOUNTER: ICD-10-CM

## 2024-06-18 PROCEDURE — 97110 THERAPEUTIC EXERCISES: CPT | Mod: GP

## 2024-06-18 NOTE — PROGRESS NOTES
Physical Therapy    Physical Therapy Treatment    Patient Name: Joe Muñoz  MRN: [id number]    Today's Date: 6/18/2024  Time Calculation  Start Time: 1345  Stop Time: 1430  Time Calculation (min): 45 min     PT Therapeutic Procedures Time Entry  Therapeutic Exercise Time Entry: 45  Visit: 8    Assessment:  PT Assessment  Assessment Comment: The patient tolerated the strengthening well this session.  Plan:  OP PT Plan  Treatment/Interventions: Cryotherapy, Education/ Instruction, Electrical stimulation, Hot pack, Manual therapy, Neuromuscular re-education, Therapeutic activities, Therapeutic exercises  PT Plan: Skilled PT  PT Frequency: 1 time per week  Duration: 8 weeks  Onset Date: 02/15/24  Certification Period Start Date: 04/01/24  Certification Period End Date: 06/30/24  Number of Treatments Authorized: 90  Rehab Potential: Excellent  Plan of Care Agreement: Patient    Current Problem  1. Traumatic incomplete tear of left rotator cuff, subsequent encounter  Follow Up In Physical Therapy          Subjective:   General  Reason for Referral: s/p L shoulder arthroscopy  Referred By: Michael Mesa MD  Preferred Learning Style: kinesthetic, verbal, visual, written  General Comment: Patient reports he has some discomfort when he lays on his shoulder for more than a minute.    Objective     Treatments:  Therapeutic Exercise  Therapeutic Exercise Performed: Yes  Therapeutic Exercise Activity 1: UBE x 5'  Therapeutic Exercise Activity 2: pulleys x 3'  Therapeutic Exercise Activity 3: rows red TB 3 x 10  Therapeutic Exercise Activity 4: shoulder extension red TB 3 x 10  Therapeutic Exercise Activity 5: ER with red TB 3 x 10  Therapeutic Exercise Activity 6: IR with red TB 3 x 10  Therapeutic Exercise Activity 7: standing I,T,Y 1# 2 x 10 ea  Therapeutic Exercise Activity 8: prone extension 2# 2 x 10  Therapeutic Exercise Activity 9: prone rows 3# 2 x 10  Therapeutic Exercise Activity 10: prone horizontal abduction 1# 2  x 10  Therapeutic Exercise Activity 11: prone scaption 1# 2 x 10  Therapeutic Exercise Activity 12: side lying ER 2# 2 x 10  Therapeutic Exercise Activity 13: side lying abduction to 90 degrees 2# 2 x 10  Therapeutic Exercise Activity 14: ball on wall scapular stabilization x 20 ea  Therapeutic Exercise Activity 15: chest press 5# 3 x 10  Therapeutic Exercise Activity 16: bicep curls 3# 3 x 10    Goals:  Active       PT Problem       Patient will report compliance with his home exercise program.        Start:  04/01/24    Expected End:  06/30/24            Patient will report improvement through the QuickDASH to show improvement in activity tolerance.          Start:  04/01/24    Expected End:  06/30/24            Patient will demonstrate improvements in left  shoulder flexion and abduction to 170 degrees to facilitate reaching into a tall cabinet.        Start:  04/01/24    Expected End:  06/30/24            Patient will demonstrate improvement in left  shoulder strength to 5/5 to facilitate lifting and carrying.        Start:  04/01/24    Expected End:  06/30/24

## 2024-06-20 DIAGNOSIS — I10 PRIMARY HYPERTENSION: ICD-10-CM

## 2024-06-20 RX ORDER — HYDROCHLOROTHIAZIDE 25 MG/1
25 TABLET ORAL DAILY
Qty: 90 TABLET | Refills: 0 | Status: SHIPPED | OUTPATIENT
Start: 2024-06-20

## 2024-07-02 ENCOUNTER — TREATMENT (OUTPATIENT)
Dept: PHYSICAL THERAPY | Facility: CLINIC | Age: 54
End: 2024-07-02
Payer: COMMERCIAL

## 2024-07-02 DIAGNOSIS — S46.012D TRAUMATIC INCOMPLETE TEAR OF LEFT ROTATOR CUFF, SUBSEQUENT ENCOUNTER: ICD-10-CM

## 2024-07-02 PROCEDURE — 97110 THERAPEUTIC EXERCISES: CPT | Mod: GP

## 2024-07-02 NOTE — PROGRESS NOTES
Physical Therapy    Physical Therapy Treatment    Patient Name: Joe Muñoz  MRN: [id number]    Today's Date: 7/2/2024  Time Calculation  Start Time: 1425  Stop Time: 1510  Time Calculation (min): 45 min     PT Therapeutic Procedures Time Entry  Therapeutic Exercise Time Entry: 45  Visit: 9    Assessment:  PT Assessment  Assessment Comment: The patient tolerated the strengthening well this session.  Plan:  OP PT Plan  Treatment/Interventions: Cryotherapy, Education/ Instruction, Electrical stimulation, Hot pack, Manual therapy, Neuromuscular re-education, Therapeutic activities, Therapeutic exercises  PT Plan: Skilled PT  PT Frequency: 1 time per week  Duration: 8 weeks  Onset Date: 02/15/24  Certification Period Start Date: 04/01/24  Certification Period End Date: 06/30/24  Number of Treatments Authorized: 90  Rehab Potential: Excellent  Plan of Care Agreement: Patient    Current Problem  1. Traumatic incomplete tear of left rotator cuff, subsequent encounter  Follow Up In Physical Therapy          Subjective:   General  Reason for Referral: s/p L shoulder arthroscopy  Referred By: Michael Mesa MD  Preferred Learning Style: kinesthetic, verbal, visual, written  General Comment: Patient reports that his shoulder feels about the same since last visit.    Objective     Treatments:  Therapeutic Exercise  Therapeutic Exercise Performed: Yes  Therapeutic Exercise Activity 1: UBE x 5'  Therapeutic Exercise Activity 2: pulleys x 3'  Therapeutic Exercise Activity 3: rows 30# 3 x 10  Therapeutic Exercise Activity 4: shoulder extension 30# 3 x 10  Therapeutic Exercise Activity 5: ER 5# 3 x 10  Therapeutic Exercise Activity 6: IR 5#  3 x 10  Therapeutic Exercise Activity 7: standing I,T,Y 2# 2 x 10 ea  Therapeutic Exercise Activity 8: prone extension 3# 2 x 10  Therapeutic Exercise Activity 9: prone rows 4# 2 x 10  Therapeutic Exercise Activity 10: prone horizontal abduction 2# 2 x 10  Therapeutic Exercise Activity 11:  prone scaption 1# 2 x 10  Therapeutic Exercise Activity 12: side lying ER 3# 2 x 10  Therapeutic Exercise Activity 13: side lying abduction to 90 degrees 3# 2 x 10  Therapeutic Exercise Activity 14: ball on wall scapular stabilization x 20 ea  Therapeutic Exercise Activity 15: chest press 5# 3 x 10  Therapeutic Exercise Activity 16: bicep curls 4# 3 x 10    Goals:  Active       PT Problem       Patient will report compliance with his home exercise program.        Start:  04/01/24    Expected End:  06/30/24            Patient will report improvement through the QuickDASH to show improvement in activity tolerance.          Start:  04/01/24    Expected End:  06/30/24            Patient will demonstrate improvements in left  shoulder flexion and abduction to 170 degrees to facilitate reaching into a tall cabinet.        Start:  04/01/24    Expected End:  06/30/24            Patient will demonstrate improvement in left  shoulder strength to 5/5 to facilitate lifting and carrying.        Start:  04/01/24    Expected End:  06/30/24

## 2024-07-03 ENCOUNTER — APPOINTMENT (OUTPATIENT)
Dept: GASTROENTEROLOGY | Facility: HOSPITAL | Age: 54
End: 2024-07-03
Payer: COMMERCIAL

## 2024-07-09 ENCOUNTER — TREATMENT (OUTPATIENT)
Dept: PHYSICAL THERAPY | Facility: CLINIC | Age: 54
End: 2024-07-09
Payer: COMMERCIAL

## 2024-07-09 DIAGNOSIS — S46.012D TRAUMATIC INCOMPLETE TEAR OF LEFT ROTATOR CUFF, SUBSEQUENT ENCOUNTER: ICD-10-CM

## 2024-07-09 PROCEDURE — 97110 THERAPEUTIC EXERCISES: CPT | Mod: GP

## 2024-07-09 NOTE — PROGRESS NOTES
Physical Therapy    Physical Therapy Treatment    Patient Name: Joe Muñoz  MRN: [id number]    Today's Date: 7/9/2024  Time Calculation  Start Time: 1425  Stop Time: 1510  Time Calculation (min): 45 min     PT Therapeutic Procedures Time Entry  Therapeutic Exercise Time Entry: 45  Visit: 10    Assessment:  PT Assessment  Assessment Comment: The patient tolerated the strengthening well this session.  Plan:  OP PT Plan  Treatment/Interventions: Cryotherapy, Education/ Instruction, Electrical stimulation, Hot pack, Manual therapy, Neuromuscular re-education, Therapeutic activities, Therapeutic exercises  PT Plan: Skilled PT  PT Frequency: 1 time per week  Duration: 8 weeks  Onset Date: 02/15/24  Certification Period Start Date: 04/01/24  Certification Period End Date: 06/30/24  Number of Treatments Authorized: 90  Rehab Potential: Excellent  Plan of Care Agreement: Patient    Current Problem    Problem List Items Addressed This Visit             ICD-10-CM    Traumatic incomplete tear of left rotator cuff S46.012A         Subjective:    General  Reason for Referral: s/p L shoulder arthroscopy  Referred By: Michael Mesa MD  Preferred Learning Style: kinesthetic, verbal, visual, written  General Comment: Patient reports that his shoulder feels about the same since last visit.    Objective     Treatments:  Therapeutic Exercise  Therapeutic Exercise Performed: Yes  Therapeutic Exercise Activity 1: UBE x 5'  Therapeutic Exercise Activity 2: pulleys x 3'  Therapeutic Exercise Activity 3: rows 30# 3 x 10  Therapeutic Exercise Activity 4: shoulder extension 30# 3 x 10  Therapeutic Exercise Activity 5: ER 5# 3 x 10  Therapeutic Exercise Activity 6: IR 5#  3 x 10  Therapeutic Exercise Activity 7: standing I,T,Y 3# 2 x 10 ea  Therapeutic Exercise Activity 8: prone extension 4# 2 x 10  Therapeutic Exercise Activity 9: prone rows 5# 2 x 10  Therapeutic Exercise Activity 10: prone horizontal abduction 2# 2 x 10  Therapeutic  Exercise Activity 11: prone scaption 2# 2 x 10  Therapeutic Exercise Activity 12: side lying ER 3# 2 x 10  Therapeutic Exercise Activity 13: side lying abduction to 90 degrees 3# 2 x 10  Therapeutic Exercise Activity 14: ball on wall scapular stabilization x 20 ea  Therapeutic Exercise Activity 15: chest press 10# 3 x 10  Therapeutic Exercise Activity 16: bicep curls 5# 3 x 10    Goals:  Active       PT Problem       Patient will report compliance with his home exercise program.        Start:  04/01/24    Expected End:  06/30/24            Patient will report improvement through the QuickDASH to show improvement in activity tolerance.          Start:  04/01/24    Expected End:  06/30/24            Patient will demonstrate improvements in left  shoulder flexion and abduction to 170 degrees to facilitate reaching into a tall cabinet.        Start:  04/01/24    Expected End:  06/30/24            Patient will demonstrate improvement in left  shoulder strength to 5/5 to facilitate lifting and carrying.        Start:  04/01/24    Expected End:  06/30/24

## 2024-07-16 ENCOUNTER — TREATMENT (OUTPATIENT)
Dept: PHYSICAL THERAPY | Facility: CLINIC | Age: 54
End: 2024-07-16
Payer: COMMERCIAL

## 2024-07-16 DIAGNOSIS — S46.012D TRAUMATIC INCOMPLETE TEAR OF LEFT ROTATOR CUFF, SUBSEQUENT ENCOUNTER: ICD-10-CM

## 2024-07-16 PROCEDURE — 97110 THERAPEUTIC EXERCISES: CPT | Mod: GP

## 2024-07-16 NOTE — PROGRESS NOTES
Physical Therapy    Physical Therapy Treatment    Patient Name: Joe Muñoz  MRN: [id number]    Today's Date: 7/16/2024  Time Calculation  Start Time: 1425  Stop Time: 1510  Time Calculation (min): 45 min     PT Therapeutic Procedures Time Entry  Therapeutic Exercise Time Entry: 45  Visit: 11    Assessment:  PT Assessment  Assessment Comment: The patient tolerated the strengthening well this session.  Plan:  OP PT Plan  Treatment/Interventions: Cryotherapy, Education/ Instruction, Electrical stimulation, Hot pack, Manual therapy, Neuromuscular re-education, Therapeutic activities, Therapeutic exercises  PT Plan: Skilled PT  PT Frequency: 1 time per week  Duration: 8 weeks  Onset Date: 02/15/24  Certification Period Start Date: 04/01/24  Certification Period End Date: 06/30/24  Number of Treatments Authorized: 90  Rehab Potential: Excellent  Plan of Care Agreement: Patient    Current Problem  Problem List Items Addressed This Visit             ICD-10-CM    Traumatic incomplete tear of left rotator cuff S46.012A         Subjective:   General  Reason for Referral: s/p L shoulder arthroscopy  Referred By: Michael Mesa MD  Preferred Learning Style: kinesthetic, verbal, visual, written  General Comment: Patient reports that his shoulder feels about the same since last visit.        Objective     Treatments:  Therapeutic Exercise  Therapeutic Exercise Performed: Yes  Therapeutic Exercise Activity 1: UBE x 5'  Therapeutic Exercise Activity 2: pulleys x 3'  Therapeutic Exercise Activity 3: rows 30# 3 x 10  Therapeutic Exercise Activity 4: shoulder extension 30# 3 x 10  Therapeutic Exercise Activity 5: ER 5# 3 x 10  Therapeutic Exercise Activity 6: IR 5#  3 x 10  Therapeutic Exercise Activity 7: standing I,T,Y 3# 2 x 10 ea  Therapeutic Exercise Activity 8: prone extension 4# 2 x 10  Therapeutic Exercise Activity 9: prone rows 5# 2 x 10  Therapeutic Exercise Activity 10: prone horizontal abduction 2# 2 x 10  Therapeutic  Exercise Activity 11: prone scaption 2# 2 x 10  Therapeutic Exercise Activity 12: side lying ER 3# 2 x 10  Therapeutic Exercise Activity 13: side lying abduction to 90 degrees 3# 2 x 10  Therapeutic Exercise Activity 14: ball on wall scapular stabilization x 20 ea  Therapeutic Exercise Activity 15: chest press 10# 3 x 10  Therapeutic Exercise Activity 16: bicep curls 5# 3 x 10    Goals:  Active       PT Problem       Patient will report compliance with his home exercise program.        Start:  04/01/24    Expected End:  06/30/24            Patient will report improvement through the QuickDASH to show improvement in activity tolerance.          Start:  04/01/24    Expected End:  06/30/24            Patient will demonstrate improvements in left  shoulder flexion and abduction to 170 degrees to facilitate reaching into a tall cabinet.        Start:  04/01/24    Expected End:  06/30/24            Patient will demonstrate improvement in left  shoulder strength to 5/5 to facilitate lifting and carrying.        Start:  04/01/24    Expected End:  06/30/24

## 2024-07-23 ENCOUNTER — TREATMENT (OUTPATIENT)
Dept: PHYSICAL THERAPY | Facility: CLINIC | Age: 54
End: 2024-07-23
Payer: COMMERCIAL

## 2024-07-23 DIAGNOSIS — S46.012D TRAUMATIC INCOMPLETE TEAR OF LEFT ROTATOR CUFF, SUBSEQUENT ENCOUNTER: ICD-10-CM

## 2024-07-23 PROCEDURE — 97110 THERAPEUTIC EXERCISES: CPT | Mod: GP

## 2024-07-23 NOTE — PROGRESS NOTES
Physical Therapy    Physical Therapy Treatment    Patient Name: Joe Muñoz  MRN: [id number]  Today's Date: 7/23/2024    Time Entry:   Time Calculation  Start Time: 1425  Stop Time: 1510  Time Calculation (min): 45 min     PT Therapeutic Procedures Time Entry  Therapeutic Exercise Time Entry: 45  Visit: 12    Assessment:  PT Assessment  Assessment Comment: The patient tolerated the strengthening well this session.  Plan:  OP PT Plan  Treatment/Interventions: Cryotherapy, Education/ Instruction, Electrical stimulation, Hot pack, Manual therapy, Neuromuscular re-education, Therapeutic activities, Therapeutic exercises  PT Plan: Skilled PT  PT Frequency: 1 time per week  Duration: 8 weeks  Onset Date: 02/15/24  Certification Period Start Date: 04/01/24  Certification Period End Date: 06/30/24  Number of Treatments Authorized: 90  Rehab Potential: Excellent  Plan of Care Agreement: Patient    Current Problem  Problem List Items Addressed This Visit             ICD-10-CM    Traumatic incomplete tear of left rotator cuff S46.012A         Subjective:    General  Reason for Referral: s/p L shoulder arthroscopy  Referred By: Michael Mesa MD  Preferred Learning Style: kinesthetic, verbal, visual, written  General Comment: Patient reports that he continues to have intermittent pain along the biceps    Objective     Treatments:  Therapeutic Exercise  Therapeutic Exercise Performed: Yes  Therapeutic Exercise Activity 1: UBE x 5'  Therapeutic Exercise Activity 2: pulleys x 3'  Therapeutic Exercise Activity 3: rows 35# 3 x 10  Therapeutic Exercise Activity 4: shoulder extension 35# 3 x 10  Therapeutic Exercise Activity 5: ER 5# 3 x 10  Therapeutic Exercise Activity 6: IR 5#  3 x 10  Therapeutic Exercise Activity 7: standing I,T,Y 3# 2 x 10 ea  Therapeutic Exercise Activity 8: prone extension 4# 2 x 10  Therapeutic Exercise Activity 9: prone rows 5# 2 x 10  Therapeutic Exercise Activity 10: prone horizontal abduction 2# 2 x  10  Therapeutic Exercise Activity 11: prone scaption 2# 2 x 10  Therapeutic Exercise Activity 12: side lying ER 3# 2 x 10  Therapeutic Exercise Activity 13: side lying abduction to 90 degrees 3# 2 x 10  Therapeutic Exercise Activity 14: ball on wall scapular stabilization x 20 ea  Therapeutic Exercise Activity 15: chest press 10# 3 x 10  Therapeutic Exercise Activity 16: bicep curls 5# 3 x 10      Goals:  Active       PT Problem       Patient will report compliance with his home exercise program.        Start:  04/01/24    Expected End:  06/30/24            Patient will report improvement through the QuickDASH to show improvement in activity tolerance.          Start:  04/01/24    Expected End:  06/30/24            Patient will demonstrate improvements in left  shoulder flexion and abduction to 170 degrees to facilitate reaching into a tall cabinet.        Start:  04/01/24    Expected End:  06/30/24            Patient will demonstrate improvement in left  shoulder strength to 5/5 to facilitate lifting and carrying.        Start:  04/01/24    Expected End:  06/30/24

## 2024-07-30 DIAGNOSIS — I10 PRIMARY HYPERTENSION: ICD-10-CM

## 2024-07-30 RX ORDER — LISINOPRIL 40 MG/1
40 TABLET ORAL DAILY
Qty: 90 TABLET | Refills: 0 | Status: SHIPPED | OUTPATIENT
Start: 2024-07-30

## 2024-08-07 ENCOUNTER — OFFICE VISIT (OUTPATIENT)
Dept: ORTHOPEDIC SURGERY | Facility: CLINIC | Age: 54
End: 2024-08-07
Payer: COMMERCIAL

## 2024-08-07 DIAGNOSIS — M19.012 ARTHRITIS OF LEFT SHOULDER REGION: ICD-10-CM

## 2024-08-07 DIAGNOSIS — S46.012D TRAUMATIC INCOMPLETE TEAR OF LEFT ROTATOR CUFF, SUBSEQUENT ENCOUNTER: Primary | ICD-10-CM

## 2024-08-07 PROCEDURE — 99213 OFFICE O/P EST LOW 20 MIN: CPT | Performed by: STUDENT IN AN ORGANIZED HEALTH CARE EDUCATION/TRAINING PROGRAM
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
## 2024-09-15 DIAGNOSIS — I10 PRIMARY HYPERTENSION: ICD-10-CM

## 2024-09-19 DIAGNOSIS — I10 PRIMARY HYPERTENSION: ICD-10-CM

## 2024-09-19 RX ORDER — HYDROCHLOROTHIAZIDE 25 MG/1
25 TABLET ORAL DAILY
Qty: 90 TABLET | Refills: 0 | Status: SHIPPED | OUTPATIENT
Start: 2024-09-19

## 2024-09-24 RX ORDER — SODIUM CHLORIDE, SODIUM LACTATE, POTASSIUM CHLORIDE, CALCIUM CHLORIDE 600; 310; 30; 20 MG/100ML; MG/100ML; MG/100ML; MG/100ML
20 INJECTION, SOLUTION INTRAVENOUS CONTINUOUS
Status: CANCELLED | OUTPATIENT
Start: 2024-09-24

## 2024-09-25 ENCOUNTER — HOSPITAL ENCOUNTER (OUTPATIENT)
Dept: GASTROENTEROLOGY | Facility: HOSPITAL | Age: 54
Setting detail: OUTPATIENT SURGERY
Discharge: HOME | End: 2024-09-25
Payer: COMMERCIAL

## 2024-09-25 VITALS
RESPIRATION RATE: 13 BRPM | WEIGHT: 165 LBS | OXYGEN SATURATION: 98 % | HEART RATE: 64 BPM | SYSTOLIC BLOOD PRESSURE: 119 MMHG | BODY MASS INDEX: 27.49 KG/M2 | DIASTOLIC BLOOD PRESSURE: 89 MMHG | HEIGHT: 65 IN | TEMPERATURE: 97.2 F

## 2024-09-25 DIAGNOSIS — L80 VITILIGO: Primary | ICD-10-CM

## 2024-09-25 DIAGNOSIS — Z12.11 ENCOUNTER FOR SCREENING COLONOSCOPY: ICD-10-CM

## 2024-09-25 PROCEDURE — 45380 COLONOSCOPY AND BIOPSY: CPT | Performed by: STUDENT IN AN ORGANIZED HEALTH CARE EDUCATION/TRAINING PROGRAM

## 2024-09-25 PROCEDURE — 99153 MOD SED SAME PHYS/QHP EA: CPT | Performed by: STUDENT IN AN ORGANIZED HEALTH CARE EDUCATION/TRAINING PROGRAM

## 2024-09-25 PROCEDURE — 3700000012 HC SEDATION LEVEL 5+ TIME - INITIAL 15 MINUTES 5/> YEARS

## 2024-09-25 PROCEDURE — 3700000013 HC SEDATION LEVEL 5+ TIME - EACH ADDITIONAL 15 MINUTES

## 2024-09-25 PROCEDURE — 99152 MOD SED SAME PHYS/QHP 5/>YRS: CPT | Performed by: STUDENT IN AN ORGANIZED HEALTH CARE EDUCATION/TRAINING PROGRAM

## 2024-09-25 PROCEDURE — 2500000004 HC RX 250 GENERAL PHARMACY W/ HCPCS (ALT 636 FOR OP/ED): Performed by: STUDENT IN AN ORGANIZED HEALTH CARE EDUCATION/TRAINING PROGRAM

## 2024-09-25 PROCEDURE — 7100000009 HC PHASE TWO TIME - INITIAL BASE CHARGE

## 2024-09-25 PROCEDURE — 7100000010 HC PHASE TWO TIME - EACH INCREMENTAL 1 MINUTE

## 2024-09-25 RX ORDER — MIDAZOLAM HYDROCHLORIDE 1 MG/ML
INJECTION, SOLUTION INTRAMUSCULAR; INTRAVENOUS AS NEEDED
Status: COMPLETED | OUTPATIENT
Start: 2024-09-25 | End: 2024-09-25

## 2024-09-25 RX ORDER — FENTANYL CITRATE 50 UG/ML
INJECTION, SOLUTION INTRAMUSCULAR; INTRAVENOUS AS NEEDED
Status: COMPLETED | OUTPATIENT
Start: 2024-09-25 | End: 2024-09-25

## 2024-09-25 ASSESSMENT — ENCOUNTER SYMPTOMS
SHORTNESS OF BREATH: 0
FEVER: 0
SORE THROAT: 0
LIGHT-HEADEDNESS: 0
WEAKNESS: 0
CHILLS: 0
ABDOMINAL PAIN: 0
DYSURIA: 0
ABDOMINAL DISTENTION: 0
COUGH: 0

## 2024-09-25 ASSESSMENT — PAIN SCALES - GENERAL

## 2024-09-25 ASSESSMENT — COLUMBIA-SUICIDE SEVERITY RATING SCALE - C-SSRS
2. HAVE YOU ACTUALLY HAD ANY THOUGHTS OF KILLING YOURSELF?: NO
1. IN THE PAST MONTH, HAVE YOU WISHED YOU WERE DEAD OR WISHED YOU COULD GO TO SLEEP AND NOT WAKE UP?: NO
6. HAVE YOU EVER DONE ANYTHING, STARTED TO DO ANYTHING, OR PREPARED TO DO ANYTHING TO END YOUR LIFE?: NO

## 2024-09-25 ASSESSMENT — PAIN - FUNCTIONAL ASSESSMENT
PAIN_FUNCTIONAL_ASSESSMENT: 0-10

## 2024-09-25 NOTE — H&P
History Of Present Illness  Joe Muñoz is a 54 y.o. male presenting for initial average risk screening colonoscopy.     Pt reports he was screened for colonCa with FIT test three years ago that was negative. Pt denies family hx of colonCa.    Pt does not take AC or anti-platelets.     Past Medical History  Past Medical History:   Diagnosis Date    Essential (primary) hypertension 10/14/2022    Hypertension    GERD (gastroesophageal reflux disease)     Hypertension     Peptic ulcer disease     Personal history of peptic ulcer disease     History of peptic ulcer     Surgical History  Past Surgical History:   Procedure Laterality Date    ELBOW SURGERY Right 2020    OTHER SURGICAL HISTORY Right 1992    Open Treatment Of Fracture Of One Metacarpal Bone     Social History  He reports that he has been smoking cigars. He has never used smokeless tobacco. He reports current alcohol use of about 6.0 standard drinks of alcohol per week. He reports that he does not use drugs.    Family History  Family History   Problem Relation Name Age of Onset    Hypertension Mother          Allergies  No Known Allergies  Review of Systems   Constitutional:  Negative for chills and fever.   HENT:  Negative for sore throat.    Respiratory:  Negative for cough and shortness of breath.    Cardiovascular:  Negative for chest pain.   Gastrointestinal:  Negative for abdominal distention and abdominal pain.   Genitourinary:  Negative for dysuria.   Skin:  Negative for rash.   Neurological:  Negative for weakness and light-headedness.     Pre-sedation Evaluation:  ASA Classification - ASA 1 - Normal health patient  Mallampati Score - II (hard and soft palate, upper portion of tonsils and uvula visible)    Physical Exam  Constitutional:       Appearance: Normal appearance.   HENT:      Head: Normocephalic and atraumatic.      Mouth/Throat:      Mouth: Mucous membranes are moist.   Eyes:      General: No scleral icterus.     Extraocular Movements:  "Extraocular movements intact.   Cardiovascular:      Rate and Rhythm: Normal rate and regular rhythm.   Pulmonary:      Effort: Pulmonary effort is normal. No respiratory distress.   Abdominal:      General: There is no distension.      Palpations: Abdomen is soft.      Tenderness: There is no abdominal tenderness.   Musculoskeletal:         General: Normal range of motion.   Skin:     General: Skin is warm and dry.   Neurological:      General: No focal deficit present.      Mental Status: He is alert and oriented to person, place, and time.          Last Recorded Vitals  Blood pressure (!) 152/94, pulse 64, temperature 36.2 °C (97.2 °F), temperature source Temporal, resp. rate 18, height 1.651 m (5' 5\"), weight 74.8 kg (165 lb), SpO2 96%.     Assessment/Plan   Proceed with colonoscopy.     PTA/Current Medications:  (Not in a hospital admission)    Current Outpatient Medications   Medication Sig Dispense Refill    diclofenac sodium (Voltaren) 1 % gel gel Apply topically once daily.      hydroCHLOROthiazide (HYDRODiuril) 25 mg tablet TAKE 1 TABLET BY MOUTH EVERY DAY 90 tablet 0    lisinopril 40 mg tablet TAKE 1 TABLET BY MOUTH EVERY DAY 90 tablet 0    multivitamin tablet Take 1 tablet by mouth once daily in the morning.      hydroCHLOROthiazide (HYDRODiuril) 25 mg tablet Take 1 tablet (25 mg) by mouth once daily. (Patient not taking: Reported on 9/25/2024) 90 tablet 0    omeprazole (PriLOSEC) 20 mg tablet,delayed release (DR/EC) EC tablet Take 1 tablet (20 mg) by mouth.       No current facility-administered medications for this encounter.     Winter Rothmna MD  "

## 2024-10-04 LAB
LABORATORY COMMENT REPORT: NORMAL
PATH REPORT.FINAL DX SPEC: NORMAL
PATH REPORT.GROSS SPEC: NORMAL
PATH REPORT.RELEVANT HX SPEC: NORMAL
PATH REPORT.TOTAL CANCER: NORMAL

## 2024-10-09 ENCOUNTER — APPOINTMENT (OUTPATIENT)
Dept: PRIMARY CARE | Facility: CLINIC | Age: 54
End: 2024-10-09
Payer: COMMERCIAL

## 2024-10-09 VITALS
SYSTOLIC BLOOD PRESSURE: 128 MMHG | WEIGHT: 164 LBS | HEIGHT: 65 IN | RESPIRATION RATE: 18 BRPM | BODY MASS INDEX: 27.32 KG/M2 | DIASTOLIC BLOOD PRESSURE: 82 MMHG | OXYGEN SATURATION: 97 % | HEART RATE: 80 BPM

## 2024-10-09 DIAGNOSIS — I10 PRIMARY HYPERTENSION: ICD-10-CM

## 2024-10-09 DIAGNOSIS — E78.2 COMBINED HYPERLIPIDEMIA: ICD-10-CM

## 2024-10-09 DIAGNOSIS — Z00.00 HEALTHCARE MAINTENANCE: Primary | ICD-10-CM

## 2024-10-09 PROCEDURE — 3008F BODY MASS INDEX DOCD: CPT | Performed by: STUDENT IN AN ORGANIZED HEALTH CARE EDUCATION/TRAINING PROGRAM

## 2024-10-09 PROCEDURE — 3079F DIAST BP 80-89 MM HG: CPT | Performed by: STUDENT IN AN ORGANIZED HEALTH CARE EDUCATION/TRAINING PROGRAM

## 2024-10-09 PROCEDURE — 3074F SYST BP LT 130 MM HG: CPT | Performed by: STUDENT IN AN ORGANIZED HEALTH CARE EDUCATION/TRAINING PROGRAM

## 2024-10-09 PROCEDURE — 99396 PREV VISIT EST AGE 40-64: CPT | Performed by: STUDENT IN AN ORGANIZED HEALTH CARE EDUCATION/TRAINING PROGRAM

## 2024-10-09 RX ORDER — LISINOPRIL 40 MG/1
40 TABLET ORAL DAILY
Qty: 90 TABLET | Refills: 1 | Status: SHIPPED | OUTPATIENT
Start: 2024-10-09

## 2024-10-09 RX ORDER — HYDROCHLOROTHIAZIDE 25 MG/1
25 TABLET ORAL DAILY
Qty: 90 TABLET | Refills: 1 | Status: SHIPPED | OUTPATIENT
Start: 2024-10-09

## 2024-10-09 ASSESSMENT — ENCOUNTER SYMPTOMS
DEPRESSION: 0
OCCASIONAL FEELINGS OF UNSTEADINESS: 0
LOSS OF SENSATION IN FEET: 0

## 2024-10-09 ASSESSMENT — PATIENT HEALTH QUESTIONNAIRE - PHQ9
1. LITTLE INTEREST OR PLEASURE IN DOING THINGS: NOT AT ALL
SUM OF ALL RESPONSES TO PHQ9 QUESTIONS 1 AND 2: 0
2. FEELING DOWN, DEPRESSED OR HOPELESS: NOT AT ALL

## 2024-10-09 NOTE — PROGRESS NOTES
"Subjective   Patient ID: Joe Muñoz is a 54 y.o. male who presents for Annual Exam.  HPI  Patient is here for annual physical exam.  No concerns endorsed    Chronic active medical problems include  - Hypertension on hydrochlorothiazide 25 and lisinopril 40  - Hyperlipidemia ASCVD score 14 2021 as below today, CT cardiac scoring ordered last visit  - Prediabetes    Healthcare maintenance  - Colonoscopy 9/2020 4-2 mm sessile polyp sigmoid colon, follow-up in 10 years  - Tdap 6/2014  Psa screen     COVID vaccinated-2 doses    Declines shingles/ flu /tdap vaccine  Past Medical History:   Diagnosis Date    Essential (primary) hypertension 10/14/2022    Hypertension    GERD (gastroesophageal reflux disease)     Hypertension     Peptic ulcer disease     Personal history of peptic ulcer disease     History of peptic ulcer      Past Surgical History:   Procedure Laterality Date    ELBOW SURGERY Right 2020    OTHER SURGICAL HISTORY Right 1992    Open Treatment Of Fracture Of One Metacarpal Bone      Family History   Problem Relation Name Age of Onset    Hypertension Mother        No Known Allergies       Occupation:     Review of Systems   Constitutional:  Negative for activity change and fever.   HENT:  Negative for congestion.    Respiratory:  Negative for cough, shortness of breath and wheezing.    Cardiovascular:  Negative for chest pain and leg swelling.   Gastrointestinal:  Negative for abdominal pain, constipation, nausea and vomiting.   Endocrine: Negative for cold intolerance.   Genitourinary:  Negative for dysuria, hematuria and urgency.   Neurological:  Negative for dizziness, speech difficulty, weakness and numbness.   Psychiatric/Behavioral:  Negative for self-injury and suicidal ideas.        Objective   Visit Vitals  /82   Pulse 80   Resp 18   Ht 1.651 m (5' 5\")   Wt 74.4 kg (164 lb)   SpO2 97%   BMI 27.29 kg/m²   Smoking Status Some Days   BSA 1.85 m²      Physical Exam  HENT:      Head: " Normocephalic and atraumatic.      Right Ear: Tympanic membrane normal.      Left Ear: Tympanic membrane normal.      Nose: Nose normal.      Mouth/Throat:      Mouth: Mucous membranes are moist.   Eyes:      Extraocular Movements: Extraocular movements intact.      Conjunctiva/sclera: Conjunctivae normal.      Pupils: Pupils are equal, round, and reactive to light.   Cardiovascular:      Rate and Rhythm: Normal rate and regular rhythm.      Pulses: Normal pulses.      Heart sounds: Normal heart sounds.   Pulmonary:      Effort: Pulmonary effort is normal.      Breath sounds: Normal breath sounds. No stridor. No rhonchi.   Abdominal:      General: Bowel sounds are normal.      Palpations: Abdomen is soft.      Tenderness: There is no abdominal tenderness. There is no guarding or rebound.   Musculoskeletal:      Cervical back: Neck supple.   Neurological:      Mental Status: He is oriented to person, place, and time.   Psychiatric:         Mood and Affect: Mood normal.         Behavior: Behavior normal.         Assessment/Plan   Diagnoses and all orders for this visit:  Healthcare maintenance  -     CT cardiac scoring wo IV contrast; Future  -     Hemoglobin A1C; Future  -     Lipid Panel; Future  -     Comprehensive Metabolic Panel; Future  -     CBC; Future  -     PSA; Future  Primary hypertension  -     hydroCHLOROthiazide (HYDRODiuril) 25 mg tablet; Take 1 tablet (25 mg) by mouth once daily.  -     lisinopril 40 mg tablet; Take 1 tablet (40 mg) by mouth once daily.  Combined hyperlipidemia  -     CT cardiac scoring wo IV contrast; Future  -     Lipid Panel; Future  -     TSH with reflex to Free T4 if abnormal; Future

## 2024-10-16 ASSESSMENT — ENCOUNTER SYMPTOMS
ACTIVITY CHANGE: 0
DYSURIA: 0
HEMATURIA: 0
DIZZINESS: 0
WHEEZING: 0
NAUSEA: 0
COUGH: 0
ABDOMINAL PAIN: 0
SPEECH DIFFICULTY: 0
CONSTIPATION: 0
FEVER: 0
VOMITING: 0
WEAKNESS: 0
SHORTNESS OF BREATH: 0
NUMBNESS: 0

## 2024-10-17 ENCOUNTER — DOCUMENTATION (OUTPATIENT)
Dept: PHYSICAL THERAPY | Facility: CLINIC | Age: 54
End: 2024-10-17
Payer: COMMERCIAL

## 2024-10-17 NOTE — PROGRESS NOTES
Physical Therapy    Discharge Summary    Name: Joe Muñoz  MRN: [id number]  : 1970  Date: 10/17/24    Discharge Summary: PT    Discharge Information: Date of discharge 10/17/24, Date of last visit 24, Date of evaluation 24, Number of attended visits 12, Referred by Michael Mesa MD, and Referred for s/p L shoulder arthroscopy    Therapy Summary: Plan of care consisted of improving left shoulder ROM and strength to facilitate daily activities.     Discharge Status: Discharge to a home exercise program.      Rehab Discharge Reason: Achieved all and/or the most significant goals(s)

## 2024-10-30 ENCOUNTER — OFFICE VISIT (OUTPATIENT)
Dept: ORTHOPEDIC SURGERY | Facility: CLINIC | Age: 54
End: 2024-10-30
Payer: COMMERCIAL

## 2024-10-30 DIAGNOSIS — M19.012 ARTHRITIS OF LEFT SHOULDER REGION: ICD-10-CM

## 2024-10-30 DIAGNOSIS — S46.012D TRAUMATIC INCOMPLETE TEAR OF LEFT ROTATOR CUFF, SUBSEQUENT ENCOUNTER: Primary | ICD-10-CM

## 2024-10-30 PROCEDURE — 99213 OFFICE O/P EST LOW 20 MIN: CPT | Performed by: STUDENT IN AN ORGANIZED HEALTH CARE EDUCATION/TRAINING PROGRAM

## 2024-11-27 ENCOUNTER — OFFICE VISIT (OUTPATIENT)
Dept: ORTHOPEDIC SURGERY | Facility: CLINIC | Age: 54
End: 2024-11-27
Payer: COMMERCIAL

## 2024-11-27 DIAGNOSIS — M19.012 ARTHRITIS OF LEFT SHOULDER REGION: ICD-10-CM

## 2024-11-27 DIAGNOSIS — S46.012D TRAUMATIC INCOMPLETE TEAR OF LEFT ROTATOR CUFF, SUBSEQUENT ENCOUNTER: Primary | ICD-10-CM

## 2024-11-27 PROCEDURE — 99212 OFFICE O/P EST SF 10 MIN: CPT | Performed by: STUDENT IN AN ORGANIZED HEALTH CARE EDUCATION/TRAINING PROGRAM
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
## 2025-01-07 DIAGNOSIS — I10 PRIMARY HYPERTENSION: ICD-10-CM

## 2025-01-08 RX ORDER — LISINOPRIL 40 MG/1
40 TABLET ORAL DAILY
Qty: 90 TABLET | Refills: 1 | Status: SHIPPED | OUTPATIENT
Start: 2025-01-08

## 2025-01-10 ENCOUNTER — HOSPITAL ENCOUNTER (OUTPATIENT)
Dept: RADIOLOGY | Facility: HOSPITAL | Age: 55
Discharge: HOME | End: 2025-01-10
Payer: COMMERCIAL

## 2025-01-10 DIAGNOSIS — Z00.00 HEALTHCARE MAINTENANCE: ICD-10-CM

## 2025-01-10 DIAGNOSIS — E78.2 COMBINED HYPERLIPIDEMIA: ICD-10-CM

## 2025-01-10 PROCEDURE — 75571 CT HRT W/O DYE W/CA TEST: CPT

## 2025-01-31 ENCOUNTER — TELEPHONE (OUTPATIENT)
Dept: DERMATOLOGY | Facility: CLINIC | Age: 55
End: 2025-01-31
Payer: COMMERCIAL

## 2025-01-31 NOTE — TELEPHONE ENCOUNTER
[facility]   Department of Dermatology  Appointment Line: [phone]      Dear JENNIFER EMERY:    We have tried reaching you regarding your appointment on MARCH 13TH @ 11:15AM with DR. VICTORIA in the department of Dermatology.    The physician will be out of the office and your appointment date/time has been changed from  MARCH 13TH @ 11:15AM to MARCH 6TH @ 8:15 AM.  A message was left for you regarding this change, but in the event that message wasn't received, we wanted to also send this letter to make sure you receive this information.    If this new appointment date/time is convenient for you, then you do not have to do anything.  If this date/time is not convenient for you, please contact the scheduling office to reschedule, at [phone].      Thank you,  [facility] Department of Dermatology

## 2025-03-06 ENCOUNTER — APPOINTMENT (OUTPATIENT)
Dept: DERMATOLOGY | Facility: CLINIC | Age: 55
End: 2025-03-06
Payer: COMMERCIAL

## 2025-03-06 DIAGNOSIS — L80 VITILIGO: ICD-10-CM

## 2025-03-06 DIAGNOSIS — B36.0 TINEA VERSICOLOR: Primary | ICD-10-CM

## 2025-03-06 DIAGNOSIS — L81.0 POST-INFLAMMATORY HYPERPIGMENTATION: ICD-10-CM

## 2025-03-06 PROCEDURE — 99204 OFFICE O/P NEW MOD 45 MIN: CPT | Performed by: STUDENT IN AN ORGANIZED HEALTH CARE EDUCATION/TRAINING PROGRAM

## 2025-03-06 RX ORDER — KETOCONAZOLE 20 MG/G
CREAM TOPICAL 2 TIMES DAILY
Qty: 30 G | Refills: 11 | Status: SHIPPED | OUTPATIENT
Start: 2025-03-06

## 2025-03-06 NOTE — PROGRESS NOTES
Subjective     Joe Muñoz is a 54 y.o. male who presents for the following: Vitiligo (Patient recently had a colonoscopy and was told that he had vitiligo around anus opening. Patient states that it has been that way for years and does not bother him.) and Rash (Patient has a rash on upper chest that has been present for several weeks. He works for [employer] and received a new vest and wonders if that may be causing the irritation.).     Review of Systems:  No other skin or systemic complaints other than what is documented elsewhere in the note.    The following portions of the chart were reviewed this encounter and updated as appropriate:          Skin Cancer History  No skin cancer on file.      Specialty Problems    None       Objective   Well appearing patient in no apparent distress; mood and affect are within normal limits.    A focused skin examination was performed. All findings within normal limits unless otherwise noted below.    Assessment/Plan   1. Tinea versicolor  Chest (Upper Torso, Anterior), Neck  Hyperpigmented scaly patches on upper chest    The fungal nature of this skin condition was discussed with the patient. Advised this is common in areas with increased moisture and occlusion.     Patient to apply ketoconazole 2% cream 2x daily for 1 month. Condition often recurs, repeat treatment courses as needed.     Call if worsening or fails to improve.      Related Medications  ketoconazole (NIZOral) 2 % cream  Apply topically 2 times a day. To rash on chest    2. Vitiligo  Buttocks  Perianal depigmented skin    Present for years  Not symptomatic  Defer treatment  Obtain TSH    TSH w/Reflex To Free T4 If Abnormal - Buttocks    3. Post-inflammatory hyperpigmentation (2)  Left Buccal Cheek, Right Buccal Cheek  Hyperpigmented macules in beard distribution    Can try shaving with clippers  Adapalene gel at bedtime to face, discussed risk of irritation

## 2025-03-07 LAB — TSH SERPL-ACNC: 1.32 MIU/L (ref 0.4–4.5)

## 2025-03-13 ENCOUNTER — APPOINTMENT (OUTPATIENT)
Dept: DERMATOLOGY | Facility: CLINIC | Age: 55
End: 2025-03-13
Payer: COMMERCIAL

## 2025-07-01 ENCOUNTER — TELEPHONE (OUTPATIENT)
Dept: PRIMARY CARE | Facility: CLINIC | Age: 55
End: 2025-07-01
Payer: COMMERCIAL

## 2025-07-01 DIAGNOSIS — I10 PRIMARY HYPERTENSION: ICD-10-CM

## 2025-07-01 RX ORDER — HYDROCHLOROTHIAZIDE 25 MG/1
25 TABLET ORAL DAILY
Qty: 90 TABLET | Refills: 1 | Status: SHIPPED | OUTPATIENT
Start: 2025-07-01

## 2025-07-07 DIAGNOSIS — I10 PRIMARY HYPERTENSION: ICD-10-CM

## 2025-07-08 RX ORDER — HYDROCHLOROTHIAZIDE 25 MG/1
25 TABLET ORAL DAILY
Qty: 90 TABLET | Refills: 1 | Status: SHIPPED | OUTPATIENT
Start: 2025-07-08

## 2025-07-23 ENCOUNTER — APPOINTMENT (OUTPATIENT)
Dept: PRIMARY CARE | Facility: CLINIC | Age: 55
End: 2025-07-23
Payer: COMMERCIAL

## 2025-07-23 VITALS
OXYGEN SATURATION: 97 % | SYSTOLIC BLOOD PRESSURE: 143 MMHG | HEIGHT: 65 IN | WEIGHT: 160 LBS | BODY MASS INDEX: 26.66 KG/M2 | HEART RATE: 74 BPM | DIASTOLIC BLOOD PRESSURE: 81 MMHG

## 2025-07-23 DIAGNOSIS — I10 PRIMARY HYPERTENSION: Primary | ICD-10-CM

## 2025-07-23 DIAGNOSIS — E78.2 COMBINED HYPERLIPIDEMIA: ICD-10-CM

## 2025-07-23 DIAGNOSIS — Z00.00 HEALTHCARE MAINTENANCE: ICD-10-CM

## 2025-07-23 PROCEDURE — 99214 OFFICE O/P EST MOD 30 MIN: CPT | Performed by: STUDENT IN AN ORGANIZED HEALTH CARE EDUCATION/TRAINING PROGRAM

## 2025-07-23 PROCEDURE — 3008F BODY MASS INDEX DOCD: CPT | Performed by: STUDENT IN AN ORGANIZED HEALTH CARE EDUCATION/TRAINING PROGRAM

## 2025-07-23 PROCEDURE — 3079F DIAST BP 80-89 MM HG: CPT | Performed by: STUDENT IN AN ORGANIZED HEALTH CARE EDUCATION/TRAINING PROGRAM

## 2025-07-23 PROCEDURE — 3077F SYST BP >= 140 MM HG: CPT | Performed by: STUDENT IN AN ORGANIZED HEALTH CARE EDUCATION/TRAINING PROGRAM

## 2025-07-23 RX ORDER — HYDROCHLOROTHIAZIDE 25 MG/1
25 TABLET ORAL DAILY
Qty: 90 TABLET | Refills: 3 | Status: SHIPPED | OUTPATIENT
Start: 2025-07-23

## 2025-07-23 RX ORDER — LISINOPRIL 40 MG/1
40 TABLET ORAL DAILY
Qty: 90 TABLET | Refills: 3 | Status: SHIPPED | OUTPATIENT
Start: 2025-07-23

## 2025-07-24 LAB
ALBUMIN SERPL-MCNC: 4.8 G/DL (ref 3.6–5.1)
ALP SERPL-CCNC: 61 U/L (ref 35–144)
ALT SERPL-CCNC: 30 U/L (ref 9–46)
ANION GAP SERPL CALCULATED.4IONS-SCNC: 8 MMOL/L (CALC) (ref 7–17)
AST SERPL-CCNC: 23 U/L (ref 10–35)
BILIRUB SERPL-MCNC: 0.6 MG/DL (ref 0.2–1.2)
BUN SERPL-MCNC: 14 MG/DL (ref 7–25)
CALCIUM SERPL-MCNC: 10.5 MG/DL (ref 8.6–10.3)
CHLORIDE SERPL-SCNC: 100 MMOL/L (ref 98–110)
CHOLEST SERPL-MCNC: 248 MG/DL
CHOLEST/HDLC SERPL: 3.2 (CALC)
CO2 SERPL-SCNC: 29 MMOL/L (ref 20–32)
CREAT SERPL-MCNC: 0.95 MG/DL (ref 0.7–1.3)
EGFRCR SERPLBLD CKD-EPI 2021: 95 ML/MIN/1.73M2
ERYTHROCYTE [DISTWIDTH] IN BLOOD BY AUTOMATED COUNT: 13 % (ref 11–15)
EST. AVERAGE GLUCOSE BLD GHB EST-MCNC: 123 MG/DL
EST. AVERAGE GLUCOSE BLD GHB EST-SCNC: 6.8 MMOL/L
GLUCOSE SERPL-MCNC: 89 MG/DL (ref 65–99)
HBA1C MFR BLD: 5.9 %
HCT VFR BLD AUTO: 46.8 % (ref 38.5–50)
HDLC SERPL-MCNC: 78 MG/DL
HGB BLD-MCNC: 15.8 G/DL (ref 13.2–17.1)
LDLC SERPL CALC-MCNC: 151 MG/DL (CALC)
MCH RBC QN AUTO: 31.5 PG (ref 27–33)
MCHC RBC AUTO-ENTMCNC: 33.8 G/DL (ref 32–36)
MCV RBC AUTO: 93.4 FL (ref 80–100)
NONHDLC SERPL-MCNC: 170 MG/DL (CALC)
PLATELET # BLD AUTO: 218 THOUSAND/UL (ref 140–400)
PMV BLD REES-ECKER: 9.5 FL (ref 7.5–12.5)
POTASSIUM SERPL-SCNC: 4.4 MMOL/L (ref 3.5–5.3)
PROT SERPL-MCNC: 7.8 G/DL (ref 6.1–8.1)
PSA SERPL-MCNC: 0.71 NG/ML
RBC # BLD AUTO: 5.01 MILLION/UL (ref 4.2–5.8)
SODIUM SERPL-SCNC: 137 MMOL/L (ref 135–146)
TRIGL SERPL-MCNC: 88 MG/DL
TSH SERPL-ACNC: 0.68 MIU/L (ref 0.4–4.5)
WBC # BLD AUTO: 4.8 THOUSAND/UL (ref 3.8–10.8)

## 2025-08-11 ASSESSMENT — ENCOUNTER SYMPTOMS
NAUSEA: 0
SHORTNESS OF BREATH: 0
SPEECH DIFFICULTY: 0
HEMATURIA: 0
COUGH: 0
WHEEZING: 0
VOMITING: 0
ABDOMINAL PAIN: 0
CONSTIPATION: 0
DYSURIA: 0
ACTIVITY CHANGE: 0
NUMBNESS: 0
WEAKNESS: 0
FEVER: 0
DIZZINESS: 0

## 2026-05-04 ENCOUNTER — APPOINTMENT (OUTPATIENT)
Dept: PRIMARY CARE | Facility: CLINIC | Age: 56
End: 2026-05-04
Payer: COMMERCIAL

## (undated) DEVICE — CANNULA, TRIPLE-DAM TWIST-IN, 7MM X 7CM, VALVED

## (undated) DEVICE — PROBE, APOLLO RF, 90 DEG, EXTRA LARTGE

## (undated) DEVICE — CANNULA, ARTHROSCOPIC CRYSTAL 5.75MM

## (undated) DEVICE — TUBING, PUMP MAIN 16FT STERILE

## (undated) DEVICE — EXCAL 4MM X 13CM SINGLE

## (undated) DEVICE — KIT, BEACH CHAIR TRIMANO